# Patient Record
Sex: FEMALE | Race: WHITE | Employment: UNEMPLOYED | URBAN - METROPOLITAN AREA
[De-identification: names, ages, dates, MRNs, and addresses within clinical notes are randomized per-mention and may not be internally consistent; named-entity substitution may affect disease eponyms.]

---

## 2019-08-13 ENCOUNTER — ANESTHESIA (OUTPATIENT)
Dept: SURGERY | Age: 39
DRG: 854 | End: 2019-08-13
Payer: SELF-PAY

## 2019-08-13 ENCOUNTER — ANESTHESIA EVENT (OUTPATIENT)
Dept: SURGERY | Age: 39
DRG: 854 | End: 2019-08-13
Payer: SELF-PAY

## 2019-08-13 ENCOUNTER — APPOINTMENT (OUTPATIENT)
Dept: GENERAL RADIOLOGY | Age: 39
DRG: 854 | End: 2019-08-13
Attending: EMERGENCY MEDICINE
Payer: SELF-PAY

## 2019-08-13 ENCOUNTER — APPOINTMENT (OUTPATIENT)
Dept: CT IMAGING | Age: 39
DRG: 854 | End: 2019-08-13
Attending: EMERGENCY MEDICINE
Payer: SELF-PAY

## 2019-08-13 ENCOUNTER — HOSPITAL ENCOUNTER (INPATIENT)
Age: 39
LOS: 3 days | Discharge: HOME HEALTH | DRG: 854 | End: 2019-08-16
Attending: EMERGENCY MEDICINE | Admitting: INTERNAL MEDICINE
Payer: SELF-PAY

## 2019-08-13 DIAGNOSIS — L03.119 CELLULITIS AND ABSCESS OF HAND: Primary | ICD-10-CM

## 2019-08-13 DIAGNOSIS — L02.519 CELLULITIS AND ABSCESS OF HAND: Primary | ICD-10-CM

## 2019-08-13 PROBLEM — F17.200 TOBACCO USE DISORDER: Status: ACTIVE | Noted: 2019-08-13

## 2019-08-13 PROBLEM — L03.90 CELLULITIS: Status: ACTIVE | Noted: 2019-08-13

## 2019-08-13 PROBLEM — L02.91 ABSCESS: Status: ACTIVE | Noted: 2019-08-13

## 2019-08-13 PROBLEM — D72.829 LEUKOCYTOSIS: Status: ACTIVE | Noted: 2019-08-13

## 2019-08-13 PROBLEM — A41.9 SEPSIS (HCC): Status: ACTIVE | Noted: 2019-08-13

## 2019-08-13 PROBLEM — M32.9 LUPUS (HCC): Status: ACTIVE | Noted: 2019-08-13

## 2019-08-13 LAB
ANION GAP SERPL CALC-SCNC: 13 MMOL/L (ref 5–15)
BASOPHILS # BLD: 0 K/UL (ref 0–0.1)
BASOPHILS NFR BLD: 0 % (ref 0–1)
BUN SERPL-MCNC: 19 MG/DL (ref 6–20)
BUN/CREAT SERPL: 20 (ref 12–20)
CALCIUM SERPL-MCNC: 9.3 MG/DL (ref 8.5–10.1)
CHLORIDE SERPL-SCNC: 104 MMOL/L (ref 97–108)
CO2 SERPL-SCNC: 21 MMOL/L (ref 21–32)
COMMENT, HOLDF: NORMAL
CREAT SERPL-MCNC: 0.96 MG/DL (ref 0.55–1.02)
DIFFERENTIAL METHOD BLD: ABNORMAL
EOSINOPHIL # BLD: 0 K/UL (ref 0–0.4)
EOSINOPHIL NFR BLD: 0 % (ref 0–7)
ERYTHROCYTE [DISTWIDTH] IN BLOOD BY AUTOMATED COUNT: 13.8 % (ref 11.5–14.5)
GLUCOSE SERPL-MCNC: 121 MG/DL (ref 65–100)
HCT VFR BLD AUTO: 43.4 % (ref 35–47)
HGB BLD-MCNC: 15.2 G/DL (ref 11.5–16)
IMM GRANULOCYTES # BLD AUTO: 0 K/UL (ref 0–0.04)
IMM GRANULOCYTES NFR BLD AUTO: 0 % (ref 0–0.5)
LACTATE BLD-SCNC: 1.25 MMOL/L (ref 0.4–2)
LYMPHOCYTES # BLD: 5.9 K/UL (ref 0.8–3.5)
LYMPHOCYTES NFR BLD: 23 % (ref 12–49)
MCH RBC QN AUTO: 31.2 PG (ref 26–34)
MCHC RBC AUTO-ENTMCNC: 35 G/DL (ref 30–36.5)
MCV RBC AUTO: 89.1 FL (ref 80–99)
MONOCYTES # BLD: 1.5 K/UL (ref 0–1)
MONOCYTES NFR BLD: 6 % (ref 5–13)
NEUTS BAND NFR BLD MANUAL: 1 %
NEUTS SEG # BLD: 18.2 K/UL (ref 1.8–8)
NEUTS SEG NFR BLD: 70 % (ref 32–75)
PLATELET # BLD AUTO: 324 K/UL (ref 150–400)
PMV BLD AUTO: 9.4 FL (ref 8.9–12.9)
POTASSIUM SERPL-SCNC: 3.9 MMOL/L (ref 3.5–5.1)
RBC # BLD AUTO: 4.87 M/UL (ref 3.8–5.2)
RBC MORPH BLD: ABNORMAL
SAMPLES BEING HELD,HOLD: NORMAL
SODIUM SERPL-SCNC: 138 MMOL/L (ref 136–145)
WBC # BLD AUTO: 25.6 K/UL (ref 3.6–11)

## 2019-08-13 PROCEDURE — 74011000272 HC RX REV CODE- 272: Performed by: ORTHOPAEDIC SURGERY

## 2019-08-13 PROCEDURE — 87205 SMEAR GRAM STAIN: CPT

## 2019-08-13 PROCEDURE — 74011000258 HC RX REV CODE- 258: Performed by: INTERNAL MEDICINE

## 2019-08-13 PROCEDURE — 76060000032 HC ANESTHESIA 0.5 TO 1 HR: Performed by: ORTHOPAEDIC SURGERY

## 2019-08-13 PROCEDURE — 74011250636 HC RX REV CODE- 250/636: Performed by: NURSE ANESTHETIST, CERTIFIED REGISTERED

## 2019-08-13 PROCEDURE — 73201 CT UPPER EXTREMITY W/DYE: CPT

## 2019-08-13 PROCEDURE — 74011250636 HC RX REV CODE- 250/636: Performed by: EMERGENCY MEDICINE

## 2019-08-13 PROCEDURE — 87185 SC STD ENZYME DETCJ PER NZM: CPT

## 2019-08-13 PROCEDURE — 90715 TDAP VACCINE 7 YRS/> IM: CPT | Performed by: EMERGENCY MEDICINE

## 2019-08-13 PROCEDURE — 83605 ASSAY OF LACTIC ACID: CPT

## 2019-08-13 PROCEDURE — 96375 TX/PRO/DX INJ NEW DRUG ADDON: CPT

## 2019-08-13 PROCEDURE — 74011250636 HC RX REV CODE- 250/636: Performed by: INTERNAL MEDICINE

## 2019-08-13 PROCEDURE — 74011250636 HC RX REV CODE- 250/636

## 2019-08-13 PROCEDURE — 0J9J0ZZ DRAINAGE OF RIGHT HAND SUBCUTANEOUS TISSUE AND FASCIA, OPEN APPROACH: ICD-10-PCS | Performed by: ORTHOPAEDIC SURGERY

## 2019-08-13 PROCEDURE — 90471 IMMUNIZATION ADMIN: CPT

## 2019-08-13 PROCEDURE — 94761 N-INVAS EAR/PLS OXIMETRY MLT: CPT

## 2019-08-13 PROCEDURE — 87040 BLOOD CULTURE FOR BACTERIA: CPT

## 2019-08-13 PROCEDURE — 87077 CULTURE AEROBIC IDENTIFY: CPT

## 2019-08-13 PROCEDURE — 74011250636 HC RX REV CODE- 250/636: Performed by: ANESTHESIOLOGY

## 2019-08-13 PROCEDURE — 36415 COLL VENOUS BLD VENIPUNCTURE: CPT

## 2019-08-13 PROCEDURE — 77030011640 HC PAD GRND REM COVD -A: Performed by: ORTHOPAEDIC SURGERY

## 2019-08-13 PROCEDURE — 85025 COMPLETE CBC W/AUTO DIFF WBC: CPT

## 2019-08-13 PROCEDURE — 74011000250 HC RX REV CODE- 250: Performed by: ORTHOPAEDIC SURGERY

## 2019-08-13 PROCEDURE — 65270000029 HC RM PRIVATE

## 2019-08-13 PROCEDURE — 77030018836 HC SOL IRR NACL ICUM -A: Performed by: ORTHOPAEDIC SURGERY

## 2019-08-13 PROCEDURE — 99284 EMERGENCY DEPT VISIT MOD MDM: CPT

## 2019-08-13 PROCEDURE — 80048 BASIC METABOLIC PNL TOTAL CA: CPT

## 2019-08-13 PROCEDURE — 74011000258 HC RX REV CODE- 258: Performed by: EMERGENCY MEDICINE

## 2019-08-13 PROCEDURE — 87075 CULTR BACTERIA EXCEPT BLOOD: CPT

## 2019-08-13 PROCEDURE — 76010000138 HC OR TIME 0.5 TO 1 HR: Performed by: ORTHOPAEDIC SURGERY

## 2019-08-13 PROCEDURE — 73130 X-RAY EXAM OF HAND: CPT

## 2019-08-13 PROCEDURE — 77030020143 HC AIRWY LARYN INTUB CGAS -A: Performed by: ANESTHESIOLOGY

## 2019-08-13 PROCEDURE — 87186 SC STD MICRODIL/AGAR DIL: CPT

## 2019-08-13 PROCEDURE — 74011636320 HC RX REV CODE- 636/320: Performed by: EMERGENCY MEDICINE

## 2019-08-13 PROCEDURE — 76210000006 HC OR PH I REC 0.5 TO 1 HR: Performed by: ORTHOPAEDIC SURGERY

## 2019-08-13 PROCEDURE — 96365 THER/PROPH/DIAG IV INF INIT: CPT

## 2019-08-13 RX ORDER — MIDAZOLAM HYDROCHLORIDE 1 MG/ML
2 INJECTION, SOLUTION INTRAMUSCULAR; INTRAVENOUS
Status: DISCONTINUED | OUTPATIENT
Start: 2019-08-13 | End: 2019-08-13 | Stop reason: HOSPADM

## 2019-08-13 RX ORDER — ACETAMINOPHEN 325 MG/1
650 TABLET ORAL
Status: DISCONTINUED | OUTPATIENT
Start: 2019-08-13 | End: 2019-08-16 | Stop reason: HOSPADM

## 2019-08-13 RX ORDER — MORPHINE SULFATE 4 MG/ML
4 INJECTION INTRAVENOUS
Status: COMPLETED | OUTPATIENT
Start: 2019-08-13 | End: 2019-08-13

## 2019-08-13 RX ORDER — SODIUM CHLORIDE, SODIUM LACTATE, POTASSIUM CHLORIDE, CALCIUM CHLORIDE 600; 310; 30; 20 MG/100ML; MG/100ML; MG/100ML; MG/100ML
125 INJECTION, SOLUTION INTRAVENOUS CONTINUOUS
Status: DISCONTINUED | OUTPATIENT
Start: 2019-08-13 | End: 2019-08-13 | Stop reason: HOSPADM

## 2019-08-13 RX ORDER — HYDROMORPHONE HYDROCHLORIDE 2 MG/ML
INJECTION, SOLUTION INTRAMUSCULAR; INTRAVENOUS; SUBCUTANEOUS AS NEEDED
Status: DISCONTINUED | OUTPATIENT
Start: 2019-08-13 | End: 2019-08-13 | Stop reason: HOSPADM

## 2019-08-13 RX ORDER — FLUMAZENIL 0.1 MG/ML
0.2 INJECTION INTRAVENOUS
Status: DISCONTINUED | OUTPATIENT
Start: 2019-08-13 | End: 2019-08-13 | Stop reason: HOSPADM

## 2019-08-13 RX ORDER — HYDROMORPHONE HYDROCHLORIDE 1 MG/ML
1 INJECTION, SOLUTION INTRAMUSCULAR; INTRAVENOUS; SUBCUTANEOUS
Status: DISCONTINUED | OUTPATIENT
Start: 2019-08-13 | End: 2019-08-16 | Stop reason: HOSPADM

## 2019-08-13 RX ORDER — ONDANSETRON 2 MG/ML
INJECTION INTRAMUSCULAR; INTRAVENOUS AS NEEDED
Status: DISCONTINUED | OUTPATIENT
Start: 2019-08-13 | End: 2019-08-13 | Stop reason: HOSPADM

## 2019-08-13 RX ORDER — PROPOFOL 10 MG/ML
INJECTION, EMULSION INTRAVENOUS AS NEEDED
Status: DISCONTINUED | OUTPATIENT
Start: 2019-08-13 | End: 2019-08-13 | Stop reason: HOSPADM

## 2019-08-13 RX ORDER — HYDROMORPHONE HYDROCHLORIDE 1 MG/ML
2 INJECTION, SOLUTION INTRAMUSCULAR; INTRAVENOUS; SUBCUTANEOUS
Status: COMPLETED | OUTPATIENT
Start: 2019-08-13 | End: 2019-08-13

## 2019-08-13 RX ORDER — IBUPROFEN 200 MG
1 TABLET ORAL DAILY
Status: DISCONTINUED | OUTPATIENT
Start: 2019-08-14 | End: 2019-08-16 | Stop reason: HOSPADM

## 2019-08-13 RX ORDER — SODIUM CHLORIDE, SODIUM LACTATE, POTASSIUM CHLORIDE, CALCIUM CHLORIDE 600; 310; 30; 20 MG/100ML; MG/100ML; MG/100ML; MG/100ML
50 INJECTION, SOLUTION INTRAVENOUS CONTINUOUS
Status: DISCONTINUED | OUTPATIENT
Start: 2019-08-13 | End: 2019-08-16 | Stop reason: HOSPADM

## 2019-08-13 RX ORDER — FENTANYL CITRATE 50 UG/ML
INJECTION, SOLUTION INTRAMUSCULAR; INTRAVENOUS AS NEEDED
Status: DISCONTINUED | OUTPATIENT
Start: 2019-08-13 | End: 2019-08-13 | Stop reason: HOSPADM

## 2019-08-13 RX ORDER — MIDAZOLAM HYDROCHLORIDE 1 MG/ML
INJECTION, SOLUTION INTRAMUSCULAR; INTRAVENOUS AS NEEDED
Status: DISCONTINUED | OUTPATIENT
Start: 2019-08-13 | End: 2019-08-13 | Stop reason: HOSPADM

## 2019-08-13 RX ORDER — HYDROMORPHONE HYDROCHLORIDE 2 MG/ML
INJECTION, SOLUTION INTRAMUSCULAR; INTRAVENOUS; SUBCUTANEOUS
Status: COMPLETED
Start: 2019-08-13 | End: 2019-08-13

## 2019-08-13 RX ORDER — HYDROMORPHONE HYDROCHLORIDE 1 MG/ML
.25-1 INJECTION, SOLUTION INTRAMUSCULAR; INTRAVENOUS; SUBCUTANEOUS
Status: DISCONTINUED | OUTPATIENT
Start: 2019-08-13 | End: 2019-08-13 | Stop reason: HOSPADM

## 2019-08-13 RX ORDER — LIDOCAINE HYDROCHLORIDE 20 MG/ML
INJECTION, SOLUTION EPIDURAL; INFILTRATION; INTRACAUDAL; PERINEURAL AS NEEDED
Status: DISCONTINUED | OUTPATIENT
Start: 2019-08-13 | End: 2019-08-13 | Stop reason: HOSPADM

## 2019-08-13 RX ORDER — DIPHENHYDRAMINE HYDROCHLORIDE 50 MG/ML
12.5 INJECTION, SOLUTION INTRAMUSCULAR; INTRAVENOUS AS NEEDED
Status: DISCONTINUED | OUTPATIENT
Start: 2019-08-13 | End: 2019-08-13 | Stop reason: HOSPADM

## 2019-08-13 RX ORDER — NALOXONE HYDROCHLORIDE 0.4 MG/ML
0.2 INJECTION, SOLUTION INTRAMUSCULAR; INTRAVENOUS; SUBCUTANEOUS
Status: DISCONTINUED | OUTPATIENT
Start: 2019-08-13 | End: 2019-08-13 | Stop reason: HOSPADM

## 2019-08-13 RX ORDER — METRONIDAZOLE 500 MG/100ML
500 INJECTION, SOLUTION INTRAVENOUS EVERY 12 HOURS
Status: DISCONTINUED | OUTPATIENT
Start: 2019-08-14 | End: 2019-08-15

## 2019-08-13 RX ORDER — METRONIDAZOLE 500 MG/100ML
500 INJECTION, SOLUTION INTRAVENOUS
Status: COMPLETED | OUTPATIENT
Start: 2019-08-13 | End: 2019-08-13

## 2019-08-13 RX ORDER — SODIUM CHLORIDE 0.9 % (FLUSH) 0.9 %
5-40 SYRINGE (ML) INJECTION AS NEEDED
Status: DISCONTINUED | OUTPATIENT
Start: 2019-08-13 | End: 2019-08-16 | Stop reason: HOSPADM

## 2019-08-13 RX ORDER — NALOXONE HYDROCHLORIDE 0.4 MG/ML
0.4 INJECTION, SOLUTION INTRAMUSCULAR; INTRAVENOUS; SUBCUTANEOUS AS NEEDED
Status: DISCONTINUED | OUTPATIENT
Start: 2019-08-13 | End: 2019-08-16 | Stop reason: HOSPADM

## 2019-08-13 RX ORDER — SODIUM CHLORIDE 0.9 % (FLUSH) 0.9 %
5-40 SYRINGE (ML) INJECTION EVERY 8 HOURS
Status: DISCONTINUED | OUTPATIENT
Start: 2019-08-13 | End: 2019-08-16 | Stop reason: HOSPADM

## 2019-08-13 RX ORDER — LIDOCAINE HYDROCHLORIDE 10 MG/ML
0.1 INJECTION, SOLUTION EPIDURAL; INFILTRATION; INTRACAUDAL; PERINEURAL AS NEEDED
Status: DISCONTINUED | OUTPATIENT
Start: 2019-08-13 | End: 2019-08-13 | Stop reason: HOSPADM

## 2019-08-13 RX ADMIN — TETANUS TOXOID, REDUCED DIPHTHERIA TOXOID AND ACELLULAR PERTUSSIS VACCINE, ADSORBED 0.5 ML: 5; 2.5; 8; 8; 2.5 SUSPENSION INTRAMUSCULAR at 16:54

## 2019-08-13 RX ADMIN — FENTANYL CITRATE 75 MCG: 50 INJECTION, SOLUTION INTRAMUSCULAR; INTRAVENOUS at 22:17

## 2019-08-13 RX ADMIN — FENTANYL CITRATE 100 MCG: 50 INJECTION, SOLUTION INTRAMUSCULAR; INTRAVENOUS at 22:11

## 2019-08-13 RX ADMIN — CEFEPIME 2 G: 2 INJECTION, POWDER, FOR SOLUTION INTRAVENOUS at 22:23

## 2019-08-13 RX ADMIN — HYDROMORPHONE HYDROCHLORIDE 1 MG: 2 INJECTION INTRAMUSCULAR; INTRAVENOUS; SUBCUTANEOUS at 22:53

## 2019-08-13 RX ADMIN — FENTANYL CITRATE 50 MCG: 50 INJECTION, SOLUTION INTRAMUSCULAR; INTRAVENOUS at 22:32

## 2019-08-13 RX ADMIN — SODIUM CHLORIDE, SODIUM LACTATE, POTASSIUM CHLORIDE, AND CALCIUM CHLORIDE 150 ML/HR: 600; 310; 30; 20 INJECTION, SOLUTION INTRAVENOUS at 20:20

## 2019-08-13 RX ADMIN — IOPAMIDOL 100 ML: 612 INJECTION, SOLUTION INTRAVENOUS at 17:53

## 2019-08-13 RX ADMIN — SODIUM CHLORIDE 1000 ML: 900 INJECTION, SOLUTION INTRAVENOUS at 17:07

## 2019-08-13 RX ADMIN — ONDANSETRON 4 MG: 2 INJECTION INTRAMUSCULAR; INTRAVENOUS at 22:30

## 2019-08-13 RX ADMIN — METRONIDAZOLE 500 MG: 500 INJECTION, SOLUTION INTRAVENOUS at 20:20

## 2019-08-13 RX ADMIN — HYDROMORPHONE HYDROCHLORIDE 2 MG: 1 INJECTION, SOLUTION INTRAMUSCULAR; INTRAVENOUS; SUBCUTANEOUS at 18:33

## 2019-08-13 RX ADMIN — LIDOCAINE HYDROCHLORIDE 50 MG: 20 INJECTION, SOLUTION INTRAVENOUS at 22:17

## 2019-08-13 RX ADMIN — PROPOFOL 160 MG: 10 INJECTION, EMULSION INTRAVENOUS at 22:17

## 2019-08-13 RX ADMIN — CEFTRIAXONE SODIUM 1 G: 1 INJECTION, POWDER, FOR SOLUTION INTRAMUSCULAR; INTRAVENOUS at 17:08

## 2019-08-13 RX ADMIN — SODIUM CHLORIDE, SODIUM LACTATE, POTASSIUM CHLORIDE, AND CALCIUM CHLORIDE 125 ML/HR: 600; 310; 30; 20 INJECTION, SOLUTION INTRAVENOUS at 22:07

## 2019-08-13 RX ADMIN — MORPHINE SULFATE 4 MG: 4 INJECTION, SOLUTION INTRAMUSCULAR; INTRAVENOUS at 16:57

## 2019-08-13 RX ADMIN — MIDAZOLAM HYDROCHLORIDE 2 MG: 1 INJECTION, SOLUTION INTRAMUSCULAR; INTRAVENOUS at 22:11

## 2019-08-13 RX ADMIN — VANCOMYCIN HYDROCHLORIDE 1000 MG: 1 INJECTION, POWDER, LYOPHILIZED, FOR SOLUTION INTRAVENOUS at 18:39

## 2019-08-13 RX ADMIN — CEFEPIME HYDROCHLORIDE 2 G: 2 INJECTION, POWDER, FOR SOLUTION INTRAVENOUS at 22:40

## 2019-08-13 RX ADMIN — FENTANYL CITRATE 25 MCG: 50 INJECTION, SOLUTION INTRAMUSCULAR; INTRAVENOUS at 22:40

## 2019-08-13 NOTE — ED NOTES
TRANSFER - OUT REPORT:    Verbal report given to Ronen Moore RN (name) on Sydni Martinez  being transferred to [facility] OR/ PACU (unit) for routine progression of care       Report consisted of patients Situation, Background, Assessment and   Recommendations(SBAR). Information from the following report(s) SBAR was reviewed with the receiving nurse. Lines:   Peripheral IV 08/13/19 Left Arm (Active)   Site Assessment Clean, dry, & intact 8/13/2019  4:45 PM   Phlebitis Assessment 0 8/13/2019  4:45 PM   Infiltration Assessment 0 8/13/2019  4:45 PM   Dressing Status Clean, dry, & intact 8/13/2019  4:45 PM   Dressing Type Tape;Transparent 8/13/2019  4:45 PM   Hub Color/Line Status Pink;Patent; Flushed 8/13/2019  4:45 PM   Action Taken Blood drawn 8/13/2019  4:45 PM   Alcohol Cap Used Yes 8/13/2019  4:45 PM        Opportunity for questions and clarification was provided.       Patient transported with:   Monitor

## 2019-08-13 NOTE — ED NOTES
Pre op checklist initiated and CHG wipes being completed in room now.  Hospitalist at bedside and writing H and P

## 2019-08-13 NOTE — H&P
[facility]  [address] [facility], [facility] [address]  [phone]    Hospitalist Admission Note      NAME:  Teresa El   :   1980   MRN:  [id number]     PCP:  None     Date/Time:  2019 7:34 PM         Assessment / Plan:        Abscess / Cellulitis: Diffuse soft tissue swelling of the hand with a rim-enhancing collection over the dorsum of the third and fourth metacarpals most consistent with abscess. Reports possible wasp sting. Endorses prior cocaine and marijuana use but states no use in years. Has a bruise at [facility] fossa, no attempt for IV placement there was done by nursing staff; denies IVDA. Check UDS. Discussed with ortho, who plans to take pt to OR tonight for I&D. Blood cultures sent. Check intraoperative cultures. Empiric IV vanc, cefepime, and Flagyl. Leukocytosis / Sepsis: due to the above. IV abx, IVF, I&D tonight. Lupus ([address]): reports hx of this, but not followed by rheumatology      Tobacco use disorder: smokes ~10 cigs per day. recommended smoking cessation. Discussed possible pharmacotherapy including nicotine replacement therapy, Chantix/Wellbutrin. Provided number for 1-800-QUIT-NOW. I spent ~4 minutes (outside of the time documented for this note) exclusively focused on tobacco cessation counseling      Code Status: FULL     ED notes and lab results reviewed.        Total time spent with patient: 50 Minutes   Time spent in the care of this patient included reviewing records, discussing with nursing, obtaining history and examining the patient, and discussing treatment plans, with >50% time spent counseling/coordinating care    Risk of deterioration: High                 Care Plan discussed with: ED provider, Patient, Nursing Staff, Consultant/Specialist and >50% of time spent in counseling and coordination of care    Discussed:  Care Plan    Prophylaxis:  SCD's    Disposition:  Home w/Family                 Subjective:     CHIEF COMPLAINT: R hand swelling/redness     HISTORY OF PRESENT ILLNESS:     Ms. Melinda Persaud is a 34 y.o. female w/ hx of Lupus who presents with R hand swelling. Started ~2 days ago, thought possibly wasp sting however she is not sure, associated with redness, pain, and fevers and chills. Symptoms are severe and worsening prompting ER visit tonight. ED workup showed SIRS; CT RUE showed diffuse soft tissue swelling of the hand with a rim-enhancing collection over  the dorsum of the third and fourth metacarpals most consistent with abscess. Lactate WNR. Ms. Melinda Persaud is admitted for further evaluation and management.       Past Medical History:   Diagnosis Date    Asthma     Bronchitis     Lupus ([address]     Migraine     Pneumonia         Past Surgical History:   Procedure Laterality Date    HX APPENDECTOMY      HX HYSTERECTOMY      HX KNEE ARTHROSCOPY         Social History     Tobacco Use    Smoking status: Current Every Day Smoker     Packs/day: 0.50    Smokeless tobacco: Never Used   Substance Use Topics    Alcohol use: Yes     Frequency: Never     Comment: rarely once every 6 months        Family History: family history of oral CA, CV disease    Allergies   Allergen Reactions    Tetracycline Unknown (comments)     Can't remember        Prior to Admission medications    Not on File       Review of Systems:  (bold if positive, if negative)    Gen:   fever, chills, fatigueEyes:  ENT:  CVS:  Pulm:  GI:    :    MS:  PainswellingSkin:  Rash, erythema, abscess, Psych:  Endo:    Hem:  Renal:    Neuro:            Objective:      VITALS:    Vital signs reviewed; most recent are:    Visit Vitals  /80 (BP 1 Location: Left arm, BP Patient Position: Sitting)   Pulse 99   Temp 99.3 °F (37.4 °C)   Resp 17   Ht 5' 5\" (1.651 m)   Wt 68.4 kg (150 lb 12.7 oz)   SpO2 93%   BMI 25.09 kg/m²     SpO2 Readings from Last 6 Encounters:   08/13/19 93%        No intake or output data in the 24 hours ending 08/13/19 1934 Exam:     Physical Exam:    Gen:  Disheveled. Well-developed, well-nourished, in no acute distress  HEENT:  No scleral icterus, PERRL, hearing intact to voice, dry mucous membranes  Neck:  Supple, without masses. Resp:  No accessory muscle use. CTAB without wheezing, rales, rhonchi  Card: tachycardic, reg rhythm. Normal S1 and S2 without murmurs, rubs, or gallops. No LE edema. No JVD. Peripheral pulses in tact including R radial pulse  Abd:  Normoactive bowel sounds. Soft, non-tender, non-distended. No rebound, no guarding. No appreciable hepatosplenomegaly   Musc:  No cyanosis or clubbing  Skin:  R hand erythema. R AC fossa bruise. Turgor intact. Cap refill ~2 secs  Neuro:  Cranial nerves are grossly intact, no focal motor weakness, follows commands appropriately  Psych:  Good insight, normal affect. Alert, oriented x 3. Answers questions appropriately       Labs:    Recent Labs     08/13/19  1652   WBC 25.6*   HGB 15.2   HCT 43.4        Recent Labs     08/13/19  1652      K 3.9      CO2 21   *   BUN 19   CREA 0.96   CA 9.3     No components found for: GLPOC  No results for input(s): PH, PCO2, PO2, HCO3, FIO2 in the last 72 hours. No results for input(s): INR in the last 72 hours. No lab exists for component: INREXT  No results found for: SDES  No results found for: CULT  All other current labs reviewed in the computer. Imaging/Studies:    CT RUE  Diffuse soft tissue swelling of the hand with a rim-enhancing collection over the dorsum of the third and fourth metacarpals most consistent with abscess.  No acute fracture    ___________________________________________________    Attending Physician: Wen Kendall MD

## 2019-08-13 NOTE — ED TRIAGE NOTES
Pt ambulates to treatment area she states that on Sunday she was swinging at a wasp to kill it and hit her had on the door frame. Today it is swollen and red, denies any fevers with the hand.   Dr Donato Wolfe at the bedside

## 2019-08-13 NOTE — ED PROVIDER NOTES
HPI     Pt is a 34 y.o. F with PMH of lupus presenting to ED with c/o right hand swelling, pain, and redness x 3 days. She is left hand dominant. She initially noticed symptoms Sunday (2 days ago) after trying to kill as wasp. She says she is unsure if she was stung by a wasp. However, she does remember she hit her hand on the door frame. She noticed pain at that time but swelling, redness and pain have increased. She took an unprescribed percocet and gabapentin last night for pain and has iced the area. She denies IV drug use. No fever or chills. No h/o recurrent abscess or skin infections. She denies other complaints at this time. Past Medical History:   Diagnosis Date    Asthma     Bronchitis     Lup[address] ([facility] [address]     Migraine     Pneumonia        Past Surgical History:   Procedure Laterality Date    HX APPENDECTOMY      HX HYSTERECTOMY      HX KNEE ARTHROSCOPY           History reviewed. No pertinent family history.     Social History     Socioeconomic History    Marital status: Not on file     Spouse name: Not on file    Number of children: Not on file    Years of education: Not on file    Highest education level: Not on file   Occupational History    Not on file   Social Needs    Financial resource strain: Not on file    Food insecurity:     Worry: Not on file     Inability: Not on file    Transportation needs:     Medical: Not on file     Non-medical: Not on file   Tobacco Use    Smoking status: Current Every Day Smoker     Packs/day: 0.50    Smokeless tobacco: Never Used   Substance and Sexual Activity    Alcohol use: Yes     Frequency: Never     Comment: rarely once every 6 months    Drug use: Never    Sexual activity: Not on file   Lifestyle    Physical activity:     Days per week: Not on file     Minutes per session: Not on file    Stress: Not on file   Relationships    Social connections:     Talks on phone: Not on file     Gets together: Not on file     Attends [employer] service: Not on file     Active member of club or organization: Not on file     Attends meetings of clubs or organizations: Not on file     Relationship status: Not on file    Intimate partner violence:     Fear of current or ex partner: Not on file     Emotionally abused: Not on file     Physically abused: Not on file     Forced sexual activity: Not on file   Other Topics Concern    Not on file   Social History Narrative    Not on file         ALLERGIES: Tetracycline    Review of Systems   Constitutional: Negative for fever. Respiratory: Negative for chest tightness and shortness of breath. Cardiovascular: Negative for chest pain and leg swelling. Gastrointestinal: Negative for nausea and vomiting. Musculoskeletal: Positive for arthralgias. Skin: Positive for color change. Negative for wound. Allergic/Immunologic: Negative for immunocompromised state. Neurological: Negative for numbness. Hematological: Negative for adenopathy. Does not bruise/bleed easily. Psychiatric/Behavioral: Negative for self-injury. The patient is nervous/anxious. Visit Vitals  /85 (BP 1 Location: Left arm, BP Patient Position: At rest)   Pulse 70   Temp 97.9 °F (36.6 °C)   Resp 18   Ht 5' 5\" (1.651 m)   Wt 68.4 kg (150 lb 12.7 oz)   SpO2 99%   BMI 25.09 kg/m²         Physical Exam   Constitutional: She is oriented to person, place, and time. She appears well-developed and well-nourished. No distress. HENT:   Head: Normocephalic and atraumatic. Eyes: Pupils are equal, round, and reactive to light. Conjunctivae and EOM are normal.   Neck: Normal range of motion. Cardiovascular: Regular rhythm, normal heart sounds, intact distal pulses and normal pulses. Tachycardia present. Pulmonary/Chest: Effort normal and breath sounds normal.   Abdominal: Soft. Bowel sounds are normal. There is no tenderness. Musculoskeletal: She exhibits edema and tenderness.         Right hand: She exhibits decreased range of motion, tenderness and swelling. Normal sensation noted. Neurological: She is alert and oriented to person, place, and time. No sensory deficit. Skin: Skin is warm and dry. She is not diaphoretic. Psychiatric: Her mood appears anxious. MDM       Procedures    Hospitalist TigerText for Admission (Dr. Stephenie Crawford)  6:24 PM    ED Room Number: WER07/07  Patient Name and age:  Ana Dean 34 y.o.  female  Working Diagnosis:   1. Cellulitis and abscess of hand      Readmission: no  Isolation Requirements:  no  Recommended Level of Care:  med/surg  Code Status:  Full Code  Department:[facility] - [phone]  Other:  Ortho consulted. (Marek Mora and Dr. Abbe Ceja). Would like pt NPO and keep hand elevated. Accepted by Dr. Stephenie Crawford 6:34 PM.  He would like to add flagyl and have vanc, cefipime, and flagyl. Will adjust antibiotics at this time. Ortho has come to see pt in ED and will plan to take her to [address] [facility].      Gary Farmer MD

## 2019-08-14 LAB
ALBUMIN SERPL-MCNC: 3.2 G/DL (ref 3.5–5)
ALBUMIN/GLOB SERPL: 0.9 {RATIO} (ref 1.1–2.2)
ALP SERPL-CCNC: 78 U/L (ref 45–117)
ALT SERPL-CCNC: 55 U/L (ref 12–78)
AMPHET UR QL SCN: NEGATIVE
ANION GAP SERPL CALC-SCNC: 4 MMOL/L (ref 5–15)
AST SERPL-CCNC: 20 U/L (ref 15–37)
BARBITURATES UR QL SCN: NEGATIVE
BASOPHILS # BLD: 0.1 K/UL (ref 0–0.1)
BASOPHILS NFR BLD: 0 % (ref 0–1)
BENZODIAZ UR QL: POSITIVE
BILIRUB SERPL-MCNC: 0.5 MG/DL (ref 0.2–1)
BUN SERPL-MCNC: 11 MG/DL (ref 6–20)
BUN/CREAT SERPL: 13 (ref 12–20)
CALCIUM SERPL-MCNC: 8.3 MG/DL (ref 8.5–10.1)
CANNABINOIDS UR QL SCN: NEGATIVE
CHLORIDE SERPL-SCNC: 112 MMOL/L (ref 97–108)
CO2 SERPL-SCNC: 23 MMOL/L (ref 21–32)
COCAINE UR QL SCN: NEGATIVE
CREAT SERPL-MCNC: 0.83 MG/DL (ref 0.55–1.02)
DIFFERENTIAL METHOD BLD: ABNORMAL
DRUG SCRN COMMENT,DRGCM: ABNORMAL
EOSINOPHIL # BLD: 0.1 K/UL (ref 0–0.4)
EOSINOPHIL NFR BLD: 0 % (ref 0–7)
ERYTHROCYTE [DISTWIDTH] IN BLOOD BY AUTOMATED COUNT: 13.4 % (ref 11.5–14.5)
GLOBULIN SER CALC-MCNC: 3.7 G/DL (ref 2–4)
GLUCOSE SERPL-MCNC: 163 MG/DL (ref 65–100)
HCT VFR BLD AUTO: 40.2 % (ref 35–47)
HGB BLD-MCNC: 13.3 G/DL (ref 11.5–16)
IMM GRANULOCYTES # BLD AUTO: 0.2 K/UL (ref 0–0.04)
IMM GRANULOCYTES NFR BLD AUTO: 1 % (ref 0–0.5)
LYMPHOCYTES # BLD: 2.3 K/UL (ref 0.8–3.5)
LYMPHOCYTES NFR BLD: 10 % (ref 12–49)
MAGNESIUM SERPL-MCNC: 2.1 MG/DL (ref 1.6–2.4)
MCH RBC QN AUTO: 30.7 PG (ref 26–34)
MCHC RBC AUTO-ENTMCNC: 33.1 G/DL (ref 30–36.5)
MCV RBC AUTO: 92.8 FL (ref 80–99)
METHADONE UR QL: NEGATIVE
MONOCYTES # BLD: 1.1 K/UL (ref 0–1)
MONOCYTES NFR BLD: 5 % (ref 5–13)
NEUTS SEG # BLD: 18.7 K/UL (ref 1.8–8)
NEUTS SEG NFR BLD: 84 % (ref 32–75)
NRBC # BLD: 0 K/UL (ref 0–0.01)
NRBC BLD-RTO: 0 PER 100 WBC
OPIATES UR QL: POSITIVE
PCP UR QL: NEGATIVE
PHOSPHATE SERPL-MCNC: 3 MG/DL (ref 2.6–4.7)
PLATELET # BLD AUTO: 266 K/UL (ref 150–400)
PMV BLD AUTO: 9.3 FL (ref 8.9–12.9)
POTASSIUM SERPL-SCNC: 4.4 MMOL/L (ref 3.5–5.1)
PROT SERPL-MCNC: 6.9 G/DL (ref 6.4–8.2)
RBC # BLD AUTO: 4.33 M/UL (ref 3.8–5.2)
SODIUM SERPL-SCNC: 139 MMOL/L (ref 136–145)
WBC # BLD AUTO: 22.4 K/UL (ref 3.6–11)

## 2019-08-14 PROCEDURE — 80053 COMPREHEN METABOLIC PANEL: CPT

## 2019-08-14 PROCEDURE — 80307 DRUG TEST PRSMV CHEM ANLYZR: CPT

## 2019-08-14 PROCEDURE — 84100 ASSAY OF PHOSPHORUS: CPT

## 2019-08-14 PROCEDURE — 74011250636 HC RX REV CODE- 250/636: Performed by: INTERNAL MEDICINE

## 2019-08-14 PROCEDURE — 83735 ASSAY OF MAGNESIUM: CPT

## 2019-08-14 PROCEDURE — 36415 COLL VENOUS BLD VENIPUNCTURE: CPT

## 2019-08-14 PROCEDURE — 65270000029 HC RM PRIVATE

## 2019-08-14 PROCEDURE — 77010033678 HC OXYGEN DAILY

## 2019-08-14 PROCEDURE — 74011250637 HC RX REV CODE- 250/637: Performed by: INTERNAL MEDICINE

## 2019-08-14 PROCEDURE — 94760 N-INVAS EAR/PLS OXIMETRY 1: CPT

## 2019-08-14 PROCEDURE — 74011000258 HC RX REV CODE- 258: Performed by: INTERNAL MEDICINE

## 2019-08-14 PROCEDURE — 85025 COMPLETE CBC W/AUTO DIFF WBC: CPT

## 2019-08-14 RX ORDER — ONDANSETRON 2 MG/ML
4 INJECTION INTRAMUSCULAR; INTRAVENOUS
Status: DISCONTINUED | OUTPATIENT
Start: 2019-08-14 | End: 2019-08-16 | Stop reason: HOSPADM

## 2019-08-14 RX ADMIN — HYDROMORPHONE HYDROCHLORIDE 1 MG: 1 INJECTION, SOLUTION INTRAMUSCULAR; INTRAVENOUS; SUBCUTANEOUS at 12:02

## 2019-08-14 RX ADMIN — ONDANSETRON 4 MG: 2 INJECTION INTRAMUSCULAR; INTRAVENOUS at 22:39

## 2019-08-14 RX ADMIN — ACETAMINOPHEN 650 MG: 325 TABLET ORAL at 06:32

## 2019-08-14 RX ADMIN — HYDROMORPHONE HYDROCHLORIDE 1 MG: 1 INJECTION, SOLUTION INTRAMUSCULAR; INTRAVENOUS; SUBCUTANEOUS at 16:01

## 2019-08-14 RX ADMIN — SODIUM CHLORIDE, SODIUM LACTATE, POTASSIUM CHLORIDE, AND CALCIUM CHLORIDE 150 ML/HR: 600; 310; 30; 20 INJECTION, SOLUTION INTRAVENOUS at 00:05

## 2019-08-14 RX ADMIN — SODIUM CHLORIDE, SODIUM LACTATE, POTASSIUM CHLORIDE, AND CALCIUM CHLORIDE 150 ML/HR: 600; 310; 30; 20 INJECTION, SOLUTION INTRAVENOUS at 19:19

## 2019-08-14 RX ADMIN — Medication 10 ML: at 00:05

## 2019-08-14 RX ADMIN — ACETAMINOPHEN 650 MG: 325 TABLET ORAL at 18:01

## 2019-08-14 RX ADMIN — METRONIDAZOLE 500 MG: 500 INJECTION, SOLUTION INTRAVENOUS at 20:05

## 2019-08-14 RX ADMIN — CEFEPIME 2 G: 2 INJECTION, POWDER, FOR SOLUTION INTRAVENOUS at 05:28

## 2019-08-14 RX ADMIN — VANCOMYCIN HYDROCHLORIDE 1000 MG: 1 INJECTION, POWDER, LYOPHILIZED, FOR SOLUTION INTRAVENOUS at 17:40

## 2019-08-14 RX ADMIN — HYDROMORPHONE HYDROCHLORIDE 1 MG: 1 INJECTION, SOLUTION INTRAMUSCULAR; INTRAVENOUS; SUBCUTANEOUS at 04:02

## 2019-08-14 RX ADMIN — ACETAMINOPHEN 650 MG: 325 TABLET ORAL at 00:04

## 2019-08-14 RX ADMIN — CEFEPIME 2 G: 2 INJECTION, POWDER, FOR SOLUTION INTRAVENOUS at 21:34

## 2019-08-14 RX ADMIN — ACETAMINOPHEN 650 MG: 325 TABLET ORAL at 12:09

## 2019-08-14 RX ADMIN — HYDROMORPHONE HYDROCHLORIDE 1 MG: 1 INJECTION, SOLUTION INTRAMUSCULAR; INTRAVENOUS; SUBCUTANEOUS at 00:04

## 2019-08-14 RX ADMIN — METRONIDAZOLE 500 MG: 500 INJECTION, SOLUTION INTRAVENOUS at 08:12

## 2019-08-14 RX ADMIN — HYDROMORPHONE HYDROCHLORIDE 1 MG: 1 INJECTION, SOLUTION INTRAMUSCULAR; INTRAVENOUS; SUBCUTANEOUS at 08:12

## 2019-08-14 RX ADMIN — CEFEPIME 2 G: 2 INJECTION, POWDER, FOR SOLUTION INTRAVENOUS at 12:02

## 2019-08-14 RX ADMIN — Medication 10 ML: at 05:28

## 2019-08-14 RX ADMIN — HYDROMORPHONE HYDROCHLORIDE 1 MG: 1 INJECTION, SOLUTION INTRAMUSCULAR; INTRAVENOUS; SUBCUTANEOUS at 20:04

## 2019-08-14 RX ADMIN — VANCOMYCIN HYDROCHLORIDE 1000 MG: 1 INJECTION, POWDER, LYOPHILIZED, FOR SOLUTION INTRAVENOUS at 06:32

## 2019-08-14 RX ADMIN — Medication 10 ML: at 21:34

## 2019-08-14 NOTE — ED NOTES
Patient has a small plastic bag with her metal left nare ring in it and a multicolored bracelet and a metal necklace. She placed them in her purse.  All other belongings are in a patient belongings bag and she is not sure if family will arrive before the transfer

## 2019-08-14 NOTE — PROGRESS NOTES
Bedside shift change report given to Jake Hobbs RN (oncoming nurse) by Rosealee Spurling, RN (offgoing nurse). Report included the following information SBAR, Kardex, Intake/Output, MAR and Recent Results.

## 2019-08-14 NOTE — PROGRESS NOTES
[facility] Dosing Services: Antimicrobial Stewardship Progress Note    Consult for antibiotic dosing of vancomycin by Dr. Stephenie Crawford  Pharmacist reviewed antibiotic appropriateness for 34 year old , female  for indication of R hand abscess  Day of Therapy 1    Plan:  Vancomycin therapy:  Start Vancomycin therapy, Vancomycin 1gm ivpb q12h. Dose calculated to approximate a therapeutic trough of 10-15mcg/mL. Pharmacy to follow daily and will make changes to dose and/or frequency based on clinical status. Date Dose & Interval Measured (mcg/mL) Extrapolated (mcg/mL)   ? 8/13/19 ? 1gm q12h ? ?   ? ? ? ?   ? ? ? ?     *  Other Antimicrobial  (not dosed by pharmacist)   Cefepime 2gm ivpb q8h, metronidazole 500mg ivpb q12h, Rocephin 1gm x1 given at [facility] ER   Cultures     Paired blood cx's pending   Serum Creatinine     Lab Results   Component Value Date/Time    Creatinine 0.96 08/13/2019 04:52 PM         Creatinine Clearance Estimated Creatinine Clearance: 75.8 mL/min (based on SCr of 0.96 mg/dL).      Temp   98.9 °F (37.2 °C)    WBC   Lab Results   Component Value Date/Time    WBC 25.6 (H) 08/13/2019 04:52 PM         H/H   Lab Results   Component Value Date/Time    HGB 15.2 08/13/2019 04:52 PM          Platelets   Lab Results   Component Value Date/Time    PLATELET 597 40/38/9057 04:52 PM            Pharmacist: Angelica Degroot PHARMD Contact information:

## 2019-08-14 NOTE — ED NOTES
Transfer Assessment: Patient A&O x4 and in no distress. Physical re-examination reveals some improvement in pts condition with reassessment of vital signs completed at the time of admission transfer. Patient is afebrile and has decreased right hand pain at this time.

## 2019-08-14 NOTE — BRIEF OP NOTE
BRIEF OPERATIVE NOTE    Date of Procedure: 8/13/2019   Preoperative Diagnosis: Right Hand Abscess  Postoperative Diagnosis: Right Hand Abscess    Procedure(s):  INCISION AND DRAINAGE, RIGHT HAND  Surgeon(s) and Role:     Maria Isabel Beasley MD - Primary         Surgical Assistant: Pablo De Guzman    Surgical Staff:  Circ-1: Monica Greco RN; Tammy Suarez  Circ-2: Joseph Renae RN  Scrub Tech-1: Slade Greenfield; Chino Manuel  Surg Asst-1: Sona VALE  Event Time In Time Out   Incision Start 2229    Incision Close 2241      Anesthesia: General   Estimated Blood Loss: min  Specimens:   ID Type Source Tests Collected by Time Destination   1 : Dorsal hand abscess Wound Hand, Right AEROBIC/ANAEROBIC CULTURE, Colletta Hoots, MD 8/13/2019 2231 Microbiology      Findings: large dorsal subaponeurotic abscess   Complications: none  Implants: * No implants in log *

## 2019-08-14 NOTE — ANESTHESIA POSTPROCEDURE EVALUATION
Procedure(s):  INCISION AND DRAINAGE, RIGHT HAND. general    Anesthesia Post Evaluation      Multimodal analgesia: multimodal analgesia not used between 6 hours prior to anesthesia start to PACU discharge  Patient location during evaluation: bedside  Patient participation: complete - patient participated  Level of consciousness: responsive to light touch  Pain management: adequate  Airway patency: patent  Anesthetic complications: no  Cardiovascular status: acceptable  Respiratory status: acceptable  Hydration status: acceptable  Post anesthesia nausea and vomiting:  controlled      Vitals Value Taken Time   /57 8/13/2019 11:05 PM   Temp 37 °C (98.6 °F) 8/13/2019 10:50 PM   Pulse 100 8/13/2019 11:06 PM   Resp 10 8/13/2019 11:06 PM   SpO2 89 % 8/13/2019 11:06 PM   Vitals shown include unvalidated device data.

## 2019-08-14 NOTE — ANESTHESIA PREPROCEDURE EVALUATION
Relevant Problems   No relevant active problems       Anesthetic History   No history of anesthetic complications            Review of Systems / Medical History  Patient summary reviewed, nursing notes reviewed and pertinent labs reviewed    Pulmonary          Smoker (0.5 PPD X 20 YEARS)         Neuro/Psych   Within defined limits           Cardiovascular                  Exercise tolerance: >4 METS     GI/Hepatic/Renal  Within defined limits              Endo/Other  Within defined limits           Other Findings              Physical Exam    Airway  Mallampati: II  TM Distance: 4 - 6 cm  Neck ROM: normal range of motion   Mouth opening: Normal     Cardiovascular    Rhythm: regular  Rate: normal         Dental  No notable dental hx       Pulmonary  Breath sounds clear to auscultation               Abdominal  GI exam deferred       Other Findings            Anesthetic Plan    ASA: 2, emergent  Anesthesia type: general          Induction: Intravenous  Anesthetic plan and risks discussed with: Patient      DISCUSSED OPTION FOR REGIONAL ANESTHESIA FOR POST OP PAIN, PATIENT AGREES, WILL DISCUSS WITH DR. Drew Dale

## 2019-08-14 NOTE — PROGRESS NOTES
CM Note:  Met with pt for d/c planning. Pt stated PTA she was independent with ADL's, was driving and walked unaided. No Rx coverage; she gets her medications from [facility] in [facility]. She is applying for the Care Card. Reason for Admission:   Abscess                   RRAT Score:     9                Plan for utilizing home health:    None at present                      Current Advanced Directive/Advance Care Plan:  Not on file;  is next of kin                         Transition of Care Plan:                    1. On IV abx  2. Cultures pending  3.  to transport at d/c  L. Vaishali Cole RN    Care Management Interventions  PCP Verified by CM: No(Pt was given a list of [employer] providers.)  Palliative Care Criteria Met (RRAT>21 & CHF Dx)?: No  Mode of Transport at Discharge: Other (see comment)()  [place] Signup: No  Discharge Durable Medical Equipment: No  Physical Therapy Consult: No  Occupational Therapy Consult: No  Speech Therapy Consult: No  Current Support Network: Lives with Spouse, Own Home, Other(2 story house with  and in-laws.   MBR on ground floor; no entry steps.)  Plan discussed with Pt/Family/Caregiver: Yes  Discharge Location  Discharge Placement: Home with two level

## 2019-08-14 NOTE — ROUTINE PROCESS
Interdisciplinary team rounds were held 8/14/2019 with the following team members:Care Management, Nursing, Nutrition, Pharmacy, Physical Therapy and Physician. Plan of care discussed. See clinical pathway and/or care plan for interventions and desired outcomes. Plan: UDS ordered and possibly home in a couple of days.

## 2019-08-14 NOTE — ED NOTES
TRANSFER - OUT REPORT:    Verbal report given to Dudley Paramedic (name) on Charbel Gee  being transferred to [facility] PACU/OR (unit) for routine progression of care       Report consisted of patients Situation, Background, Assessment and   Recommendations(SBAR). Information from the following report(s) SBAR was reviewed with the receiving nurse. Lines:   Peripheral IV 08/13/19 Left Arm (Active)   Site Assessment Clean, dry, & intact 8/13/2019  4:45 PM   Phlebitis Assessment 0 8/13/2019  4:45 PM   Infiltration Assessment 0 8/13/2019  4:45 PM   Dressing Status Clean, dry, & intact 8/13/2019  4:45 PM   Dressing Type Tape;Transparent 8/13/2019  4:45 PM   Hub Color/Line Status Pink;Patent; Flushed 8/13/2019  4:45 PM   Action Taken Blood drawn 8/13/2019  4:45 PM   Alcohol Cap Used Yes 8/13/2019  4:45 PM        Opportunity for questions and clarification was provided.       Patient transported with:   Monitor

## 2019-08-14 NOTE — PROGRESS NOTES
Orthopaedic Progress Note  Post Op day: 1 Day Post-Op    2019 3:27 PM     Patient: Linn Morrison MRN: [id number]  SSN: [id number]    YOB: 1980  Age: 34 y.o. Sex: female      Admit date:  2019  Date of Surgery:  2019   Procedures:  Procedure(s):  INCISION AND DRAINAGE, RIGHT HAND  Admitting Physician:  Jarrett Oneal MD   Surgeon:  Michelet Castillo) and Role:     Slade Ayala MD - Primary    Consulting Physician(s): Treatment Team: Attending Provider: Shai Sparks MD; Consulting Provider: Lum Leyden, PA; Consulting Provider: Maria Antonia Santos MD; Surgeon: Raimundo Mitchell MD; Care Manager: Pascual Gonzalez.; Utilization Review: Ellen Fong RN    SUBJECTIVE:     Linn Morrison is a 34 y.o. female is 1 Day Post-Op s/p Procedure(s):  INCISION AND DRAINAGE, RIGHT HAND with an appropriate level of post-operative pain. No complaints of nausea, vomiting, dizziness, lightheadedness, chest pain, or shortness of breath. OBJECTIVE:       Physical Exam:  General: Alert, cooperative, no distress. Respiratory: Respirations unlabored  Neurological:  Neurovascular exam within normal limits. Motor: + DF/PF. Musculoskeletal: Calves soft, supple, non-tender upon palpation. Dressing/Wound:  Clean, dry and intact. No significant erythema or swelling. Cap refill less than 3 seconds. Moves fingers without difficulty. Yellow cheese wedge pillow is laying flat, propped hand where it is elevated above level of heart.      Vital Signs:        Patient Vitals for the past 8 hrs:   BP Temp Pulse Resp SpO2   19 1255 125/65 98.1 °F (36.7 °C) 69 19 100 %   19 0747 117/85 97.9 °F (36.6 °C) 70 18 99 %                                          Temp (24hrs), Av.8 °F (37.1 °C), Min:97.9 °F (36.6 °C), Max:99.9 °F (37.7 °C)      Labs:        Recent Labs     19  0409   HCT 40.2   HGB 13.3     Lab Results   Component Value Date/Time    Sodium 139 08/14/2019 04:09 AM    Potassium 4.4 08/14/2019 04:09 AM    Chloride 112 (H) 08/14/2019 04:09 AM    CO2 23 08/14/2019 04:09 AM    Glucose 163 (H) 08/14/2019 04:09 AM    BUN 11 08/14/2019 04:09 AM    Creatinine 0.83 08/14/2019 04:09 AM    Calcium 8.3 (L) 08/14/2019 04:09 AM       PT/OT:                Patient mobility                         ASSESSMENT / PLAN:   Principal Problem:    Abscess (8/13/2019)    Active Problems:    Cellulitis (8/13/2019)      Lupus ([facility] [address] (8/13/2019)      Leukocytosis (8/13/2019)      Sepsis ([facility] [address] (8/13/2019)      Tobacco use disorder (8/13/2019)                    Pain Control: Adequate with oral agents and PRN IV narcotics   DVT Prophylaxis: Up ad trevor   Therapy/ Weight bearing: WBAT. Right UE in dressing/ NWB. Keep elevated. Wound/ incisional issues: C, D & I   Medical concerns: Awaiting culture results   Disposition: Home/ TBD abx selection.       Signed By:  Miriam Valadez NP    Orthopedic Trauma Nurse Practitioner  [address] [place] [address]

## 2019-08-14 NOTE — PERIOP NOTES
TRANSFER - OUT REPORT:    Verbal report given to DILSHAD Saldana on Purvi Avilez  being transferred to room 505 for routine post - op       Report consisted of patients Situation, Background, Assessment and   Recommendations(SBAR). Information from the following report(s) SBAR, Kardex, OR Summary, Recent Results, Med Rec Status and Cardiac Rhythm ST was reviewed with the receiving nurse. Lines:   Peripheral IV 08/13/19 Left Arm (Active)   Site Assessment Clean, dry, & intact 8/13/2019 10:50 PM   Phlebitis Assessment 0 8/13/2019 10:50 PM   Infiltration Assessment 0 8/13/2019 10:50 PM   Dressing Status Clean, dry, & intact 8/13/2019 10:50 PM   Dressing Type Transparent 8/13/2019 10:50 PM   Hub Color/Line Status Pink;Patent; Flushed 8/13/2019  4:45 PM   Action Taken Blood drawn 8/13/2019  4:45 PM   Alcohol Cap Used Yes 8/13/2019  4:45 PM       Peripheral IV 08/13/19 Left Hand (Active)   Site Assessment Clean, dry, & intact 8/13/2019 10:50 PM   Phlebitis Assessment 0 8/13/2019 10:50 PM   Infiltration Assessment 0 8/13/2019 10:50 PM   Dressing Status Clean, dry, & intact; Occlusive 8/13/2019 10:50 PM   Dressing Type Transparent 8/13/2019 10:50 PM   Hub Color/Line Status Pink; Infusing 8/13/2019 10:50 PM        Opportunity for questions and clarification was provided.       Patient transported with:  2L NC and RN

## 2019-08-14 NOTE — CONSULTS
ASSESSMENT    Ortho Issues : r hand swellig/abscess    PLAN  1. Pain Control : Oral Narcotics and IV Narcotics  2. Medical Issues : r hand infection/abscess  3. Therapy : Mobilize with PT / OT Weight bearing Status : nwb rue  4. DVT Prophylaxis : Mechanical / lovenox while inpt  5. Disposition : Case Management for Home          Subjective:       Name : Mecca Ayala is a 34 y.o. female  with a history of r hand swelling x a few days, worsening, came to ed today, denies n/t, attributes to wasp bite. No other injury or prior problem has received iv abx. .     Pertinent historical findings include the following :  Mechanism wasp?    Locations : r hand  Duration : several days  Intensity : severe  Associated Symptoms : no f/c    Past Medical History :   Past Medical History:   Diagnosis Date    Asthma     Bronchitis     Lupus ([address]     Migraine     Pneumonia        Past Surgical History :   Past Surgical History:   Procedure Laterality Date    HX APPENDECTOMY      HX HYSTERECTOMY      HX KNEE ARTHROSCOPY          Medications :  See med reconciliation    Allergies :   Tetracycline and Nsaids (non-steroidal anti-inflammatory drug)        Objective  Objective:     Patient Vitals for the past 12 hrs:   BP Temp Pulse Resp SpO2 Height Weight   08/13/19 2140 109/80 99.5 °F (37.5 °C) 98 20 95 %     08/13/19 2045 111/73  92 13 96 %     08/13/19 2030 125/73  96 15 95 %     08/13/19 2021 93/72 98.9 °F (37.2 °C) 93 14 97 %     08/13/19 2000 107/68  96 13 96 %     08/13/19 1951 152/82  100 18 94 %     08/13/19 1900 (!) 131/97  (!) 104 17 97 %     08/13/19 1849   99       08/13/19 1845 117/85  (!) 107 15 94 %     08/13/19 1840 148/80 99.3 °F (37.4 °C) (!) 111 17 93 %     08/13/19 1815 (!) 150/121  (!) 106 18 95 %     08/13/19 1800 129/80  (!) 105 18 96 %     08/13/19 1730 142/90  (!) 101 16 94 %     08/13/19 1700 142/86  (!) 113 17 95 %     08/13/19 1640 136/56 99.9 °F (37.7 °C) (!) 132 16 96 % 5' 5\" (1.651 m) 68.4 kg (150 lb 12.7 oz)       Alert and Oriented x 3  No Acute Respiratory Distress  Heart - RRR  Lungs - CTAB    Focused Physical Exam :   Impressive hand swelling  Exquisite ttp dorsally, volar swelling mild tenderness  Fingers senstae  Flexor tendons intact  Flexor tendon sheaths with v'little tenderness  Wrist rom restricted, mild tenderness    No Lymphadenopathy   Palpable pulses  No skin trophic changes    Labs :   Recent Labs     08/13/19  1652   HGB 15.2   HCT 43.4      K 3.9      CO2 21   BUN 19   CREA 0.96   *       X-rays/ct  Dorsal collection c/w abscess, xr with expected soft tissue swelling, no bony erosion/fx      Elizabeth Catalan MD

## 2019-08-14 NOTE — PROGRESS NOTES
0730  Bedside and Verbal shift change report given to Rylee Abbott RN (oncoming nurse) by Shirley Garner RN (offgoing nurse). Report included the following information SBAR, Kardex, Intake/Output, MAR, Recent Results and Med Rec Status.

## 2019-08-14 NOTE — ED NOTES
Patient is now on her third antibiotic and has completed first liter of NS.  Has her eyes shut and appears calm on the stretcher

## 2019-08-14 NOTE — PROGRESS NOTES
[facility] Dosing Services: Antimicrobial Stewardship Daily Doc    Consult for antibiotic dosing of vancomycin by Dr. Todd Underwood  Indication: Right hand cellulitis associated with abscess s/p I&D on 8/13  Day of Therapy: 2    Ht Readings from Last 1 Encounters:   08/13/19 165.1 cm (65\")        Wt Readings from Last 1 Encounters:   08/13/19 68.4 kg (150 lb 12.7 oz)     Vancomycin therapy:  Current maintenance dose: 1000 mg IV every 12 hours  Dose calculated to approximate a therapeutic trough of 10-15 mcg/mL  Plan:  SCr stable, leukocytosis continues WBC 22.4, afebrile today, culture results pending  Would target trough goal closer to 15 mcg/mL due to SSTI with abscess, but she did have I&D yesterday  Continue the current empiric regimen. Will order a trough level prior to 4th dose which will be tomorrow 8/15 at 0600  Will also order SCr daily with AM labs per protocol  Dose administration notes:   Doses given appropriately as scheduled    Date Dose & Interval Measured (mcg/mL) Extrapolated (mcg/mL)   ? ? ? ?   ? ? ? ?   ? ? ? ? Other Antimicrobial   (not dosed by pharmacist) Cefepime 2 g IV every 8 hours  Metronidazole 500 mg IV every 12 hours   Cultures 8/13 - Blood, paired - NGTD x 6 hours - Prelim  8/13 - Wound - Pending  8/13 - Anaerobic - Pending   Serum Creatinine Lab Results   Component Value Date/Time    Creatinine 0.83 08/14/2019 04:09 AM           Creatinine Clearance Estimated Creatinine Clearance: 87.6 mL/min (based on SCr of 0.83 mg/dL).      Temp Temp: 97.9 °F (36.6 °C)       WBC Lab Results   Component Value Date/Time    WBC 22.4 (H) 08/14/2019 04:09 AM          H/H Lab Results   Component Value Date/Time    HGB 13.3 08/14/2019 04:09 AM          Platelets    Lab Results   Component Value Date/Time    PLATELET 020 37/14/6749 04:09 AM            For Antifungals, Metronidazole, and Nafcillin:  ALT:  55      AST:  20    Alk Phos:   78   T Bili: 0.5    Pharmacist Pola Eric, PHARMD Contact information:

## 2019-08-14 NOTE — PROGRESS NOTES
[facility]  [address] [place] [address]  [phone]      Medical Progress Note      NAME: Nigel Dodd   :  1980  MRM:  [id number]    Date/Time: 2019  12:49 PM         Subjective:     Chief Complaint:  F/u hand infection    ROS:  (bold if positive, if negative)      Tolerating PT  Tolerating Diet          Objective:       Vitals:          Last 24hrs VS reviewed since prior progress note. Most recent are:    Visit Vitals  /85 (BP 1 Location: Left arm, BP Patient Position: At rest)   Pulse 70   Temp 97.9 °F (36.6 °C)   Resp 18   Ht 5' 5\" (1.651 m)   Wt 68.4 kg (150 lb 12.7 oz)   SpO2 99%   BMI 25.09 kg/m²     SpO2 Readings from Last 6 Encounters:   19 99%    O2 Flow Rate (L/min): 2 l/min       Intake/Output Summary (Last 24 hours) at 2019 1249  Last data filed at 2019 2245  Gross per 24 hour   Intake 450 ml   Output 20 ml   Net 430 ml          Exam:     Physical Exam:    Gen:  Well-developed, well-nourished, in no acute distress  HEENT:  Pink conjunctivae, PERRL, hearing intact to voice, moist mucous membranes  Resp:  No accessory muscle use, clear breath sounds without wheezes rales or rhonchi  Card:  No murmurs, normal S1, S2 without thrills, bruits or peripheral edema  Abd:  Soft, non-tender, non-distended, normoactive bowel sounds are present  Skin:  Right hand dressing c/d/i  Neuro:  No focal deficits, follows commands appropriately  Psych:  Good insight, oriented to person, place and time, alert            Medications Reviewed: (see below)    Lab Data Reviewed: (see below)    ______________________________________________________________________    Medications:     Current Facility-Administered Medications   Medication Dose Route Frequency    [START ON 8/15/2019] Vancomycin - Please draw trough level prior to dose due on 8/15 at 0600. Thanks!    Other ONCE    sodium chloride (NS) flush 5-40 mL  5-40 mL IntraVENous Q8H    sodium chloride (NS) flush 5-40 mL  5-40 mL IntraVENous PRN    acetaminophen (TYLENOL) tablet 650 mg  650 mg Oral Q6H PRN    naloxone (NARCAN) injection 0.4 mg  0.4 mg IntraVENous PRN    lactated Ringers infusion  150 mL/hr IntraVENous CONTINUOUS    nicotine (NICODERM CQ) 14 mg/24 hr patch 1 Patch  1 Patch TransDERmal DAILY    HYDROmorphone (DILAUDID) syringe 1 mg  1 mg IntraVENous Q4H PRN    cefepime (MAXIPIME) 2 g in 0.9% sodium chloride (MBP/ADV) 100 mL  2 g IntraVENous Q8H    metroNIDAZOLE (FLAGYL) IVPB premix 500 mg  500 mg IntraVENous Q12H    vancomycin (VANCOCIN) 1,000 mg in 0.9% sodium chloride (MBP/ADV) 250 mL  1 g IntraVENous Q12H            Lab Review:     Recent Labs     08/14/19  0409 08/13/19  1652   WBC 22.4* 25.6*   HGB 13.3 15.2   HCT 40.2 43.4    324     Recent Labs     08/14/19  0409 08/13/19  1652    138   K 4.4 3.9   * 104   CO2 23 21   * 121*   BUN 11 19   CREA 0.83 0.96   CA 8.3* 9.3   MG 2.1  --    PHOS 3.0  --    ALB 3.2*  --    SGOT 20  --    ALT 55  --      No components found for: Dov Point         Assessment / Plan:      Abscess / Cellulitis: Diffuse soft tissue swelling of the hand with a rim-enhancing collection over the dorsum of the third and fourth metacarpals most consistent with abscess. Reports possible wasp sting; although there is some concern for IVDA. Sp I&D by ortho. Continue IV vanc, cefepime, and Flagyl. awaiting intro-op culturse       Leukocytosis / Sepsis: due to the above.  IV abx, IVF        Lupus ([address]): reports hx of this, but not followed by rheumatology       Tobacco use disorder: counseled on cessation      Total time spent with patient: [address] discussed with: Patient    Discussed:  Care Plan    Prophylaxis:  Lovenox    Disposition:  Home w/Family           ___________________________________________________    Attending Physician: Christiano Jamison MD

## 2019-08-15 LAB
COMMENT, HOLDF: NORMAL
DATE LAST DOSE: NORMAL
REPORTED DOSE,DOSE: NORMAL UNITS
REPORTED DOSE/TIME,TMG: NORMAL
SAMPLES BEING HELD,HOLD: NORMAL
VANCOMYCIN TROUGH SERPL-MCNC: 7.4 UG/ML (ref 5–10)

## 2019-08-15 PROCEDURE — 74011000258 HC RX REV CODE- 258: Performed by: INTERNAL MEDICINE

## 2019-08-15 PROCEDURE — 36415 COLL VENOUS BLD VENIPUNCTURE: CPT

## 2019-08-15 PROCEDURE — 74011250636 HC RX REV CODE- 250/636: Performed by: INTERNAL MEDICINE

## 2019-08-15 PROCEDURE — 80202 ASSAY OF VANCOMYCIN: CPT

## 2019-08-15 PROCEDURE — 65270000029 HC RM PRIVATE

## 2019-08-15 PROCEDURE — 94760 N-INVAS EAR/PLS OXIMETRY 1: CPT

## 2019-08-15 PROCEDURE — 74011250637 HC RX REV CODE- 250/637: Performed by: INTERNAL MEDICINE

## 2019-08-15 RX ORDER — OXYCODONE AND ACETAMINOPHEN 5; 325 MG/1; MG/1
1 TABLET ORAL
Status: DISCONTINUED | OUTPATIENT
Start: 2019-08-15 | End: 2019-08-16 | Stop reason: HOSPADM

## 2019-08-15 RX ADMIN — SODIUM CHLORIDE, SODIUM LACTATE, POTASSIUM CHLORIDE, AND CALCIUM CHLORIDE 150 ML/HR: 600; 310; 30; 20 INJECTION, SOLUTION INTRAVENOUS at 04:04

## 2019-08-15 RX ADMIN — HYDROMORPHONE HYDROCHLORIDE 1 MG: 1 INJECTION, SOLUTION INTRAMUSCULAR; INTRAVENOUS; SUBCUTANEOUS at 00:04

## 2019-08-15 RX ADMIN — HYDROMORPHONE HYDROCHLORIDE 1 MG: 1 INJECTION, SOLUTION INTRAMUSCULAR; INTRAVENOUS; SUBCUTANEOUS at 04:04

## 2019-08-15 RX ADMIN — Medication 10 ML: at 05:27

## 2019-08-15 RX ADMIN — ACETAMINOPHEN 650 MG: 325 TABLET ORAL at 00:09

## 2019-08-15 RX ADMIN — ACETAMINOPHEN 650 MG: 325 TABLET ORAL at 06:07

## 2019-08-15 RX ADMIN — VANCOMYCIN HYDROCHLORIDE 1000 MG: 1 INJECTION, POWDER, LYOPHILIZED, FOR SOLUTION INTRAVENOUS at 06:07

## 2019-08-15 RX ADMIN — VANCOMYCIN HYDROCHLORIDE 1000 MG: 1 INJECTION, POWDER, LYOPHILIZED, FOR SOLUTION INTRAVENOUS at 13:56

## 2019-08-15 RX ADMIN — ACETAMINOPHEN 650 MG: 325 TABLET ORAL at 21:02

## 2019-08-15 RX ADMIN — Medication 10 ML: at 13:53

## 2019-08-15 RX ADMIN — Medication 10 ML: at 21:05

## 2019-08-15 RX ADMIN — HYDROMORPHONE HYDROCHLORIDE 1 MG: 1 INJECTION, SOLUTION INTRAMUSCULAR; INTRAVENOUS; SUBCUTANEOUS at 08:14

## 2019-08-15 RX ADMIN — HYDROMORPHONE HYDROCHLORIDE 1 MG: 1 INJECTION, SOLUTION INTRAMUSCULAR; INTRAVENOUS; SUBCUTANEOUS at 12:07

## 2019-08-15 RX ADMIN — HYDROMORPHONE HYDROCHLORIDE 1 MG: 1 INJECTION, SOLUTION INTRAMUSCULAR; INTRAVENOUS; SUBCUTANEOUS at 16:05

## 2019-08-15 RX ADMIN — CEFEPIME 2 G: 2 INJECTION, POWDER, FOR SOLUTION INTRAVENOUS at 05:26

## 2019-08-15 RX ADMIN — VANCOMYCIN HYDROCHLORIDE 1000 MG: 1 INJECTION, POWDER, LYOPHILIZED, FOR SOLUTION INTRAVENOUS at 21:02

## 2019-08-15 RX ADMIN — HYDROMORPHONE HYDROCHLORIDE 1 MG: 1 INJECTION, SOLUTION INTRAMUSCULAR; INTRAVENOUS; SUBCUTANEOUS at 20:03

## 2019-08-15 RX ADMIN — CEFEPIME 2 G: 2 INJECTION, POWDER, FOR SOLUTION INTRAVENOUS at 12:07

## 2019-08-15 NOTE — PROGRESS NOTES
Educated patient to elevate right hand. Placed pillow under right hand. Medicated with 1 mg dilaudid.

## 2019-08-15 NOTE — PROGRESS NOTES
[facility] Dosing Services: Antimicrobial Stewardship Daily Doc    Consult for antibiotic dosing of vancomycin by Dr. Naeem Davis  Indication: Right hand cellulitis associated with abscess s/p I&D on 8/13  Day of Therapy: 3    Ht Readings from Last 1 Encounters:   08/13/19 165.1 cm (65\")        Wt Readings from Last 1 Encounters:   08/13/19 68.4 kg (150 lb 12.7 oz)     Vancomycin therapy:  Current maintenance dose: 1000 mg IV every 12 hours  Dose calculated to approximate a therapeutic trough of 10-15 mcg/mL  Plan:  No new BMP or CBC this AM, SCr was stable, leukocytosis yesterday WBC 22.4, afebrile today, culture results pending  Vancomycin trough level is sub-therapeutic. Would target trough goal closer to 15 mcg/mL due to SSTI with abscess, but she did have I&D on 8/13  Will modify regimen to 1000 mg IV every 8 hours to predict a trough of 14.8 mcg/mL. Keeping at 15 mg/kg dosing. SCr daily with AM labs ordered per protocol but was not drawn this AM?  Dose administration notes:   Doses given appropriately as scheduled    Date Dose & Interval Measured (mcg/mL) Extrapolated (mcg/mL)   ? 8/15 at 0606 ?1000 mg IV q12h ?7.4 ? 7.4   ? ? ? ?   ? ? ? ? Other Antimicrobial   (not dosed by pharmacist) Cefepime 2 g IV every 8 hours - appropriate for renal function    Metronidazole discontinued on 8/15   Cultures 8/13 - Blood, paired - NGTD x 2 days - Prelim  8/13 - Wound - Pending  8/13 - Anaerobic - Pending   Serum Creatinine Lab Results   Component Value Date/Time    Creatinine 0.83 08/14/2019 04:09 AM         Creatinine Clearance Estimated Creatinine Clearance: 87.6 mL/min (based on SCr of 0.83 mg/dL).      Temp Temp: 98 °F (36.7 °C)       WBC Lab Results   Component Value Date/Time    WBC 22.4 (H) 08/14/2019 04:09 AM        H/H Lab Results   Component Value Date/Time    HGB 13.3 08/14/2019 04:09 AM        Platelets    Lab Results   Component Value Date/Time    PLATELET 671 03/01/8158 04:09 AM          For Antifungals, Metronidazole, and Nafcillin:  ALT:  55      AST:  20    Alk Phos:   78   T Bili: 0.5    Pharmacist ANNABEL Chung Contact information:

## 2019-08-15 NOTE — PROGRESS NOTES
2200  Pt states she feels nauseous. Dr. Malathi Zaman stated orders were to follow. Will continue to monitor. 0730  Bedside and Verbal shift change report given to [address], RN (oncoming nurse) by Violeta Ramos RN (offgoing nurse).  Report included the following information SBAR, Kardex, Intake/Output, MAR, Recent Results and Med Rec Status.

## 2019-08-15 NOTE — PROGRESS NOTES
[facility]  [address]  [phone]      Medical Progress Note      NAME: Abdoulaye Franco   :  1980  MRM:  [id number]    Date/Time: 8/15/2019  12:49 PM         Subjective:     Chief Complaint:  F/u hand infection    ROS:  (bold if positive, if negative)      Tolerating PT  Tolerating Diet          Objective:       Vitals:          Last 24hrs VS reviewed since prior progress note.  Most recent are:    Visit Vitals  /70 (BP 1 Location: Left arm, BP Patient Position: Sitting)   Pulse 76   Temp 98.4 °F (36.9 °C)   Resp 17   Ht 5' 5\" (1.651 m)   Wt 68.4 kg (150 lb 12.7 oz)   SpO2 95%   BMI 25.09 kg/m²     SpO2 Readings from Last 6 Encounters:   08/15/19 95%    O2 Flow Rate (L/min): 2 l/min       Intake/Output Summary (Last 24 hours) at 8/15/2019 1422  Last data filed at 8/15/2019 6813  Gross per 24 hour   Intake 6637.5 ml   Output    Net 6637.5 ml          Exam:     Physical Exam:    Gen:  Well-developed, well-nourished, in no acute distress  HEENT:  Pink conjunctivae, PERRL, hearing intact to voice, moist mucous membranes  Resp:  No accessory muscle use, clear breath sounds without wheezes rales or rhonchi  Card:  No murmurs, normal S1, S2 without thrills, bruits or peripheral edema  Abd:  Soft, non-tender, non-distended, normoactive bowel sounds are present  Skin:  Right hand dressing c/d/i  Neuro:  No focal deficits, follows commands appropriately  Psych:  Good insight, oriented to person, place and time, alert            Medications Reviewed: (see below)    Lab Data Reviewed: (see below)    ______________________________________________________________________    Medications:     Current Facility-Administered Medications   Medication Dose Route Frequency    vancomycin (VANCOCIN) 1,000 mg in 0.9% sodium chloride (MBP/ADV) 250 mL  1 g IntraVENous Q8H    [START ON 2019] Vancomycin - Please draw trough level prior to dose due on  at 0600. Thanks! Other ONCE    oxyCODONE-acetaminophen (PERCOCET) 5-325 mg per tablet 1 Tab  1 Tab Oral Q4H PRN    ondansetron (ZOFRAN) injection 4 mg  4 mg IntraVENous Q6H PRN    sodium chloride (NS) flush 5-40 mL  5-40 mL IntraVENous Q8H    sodium chloride (NS) flush 5-40 mL  5-40 mL IntraVENous PRN    acetaminophen (TYLENOL) tablet 650 mg  650 mg Oral Q6H PRN    naloxone (NARCAN) injection 0.4 mg  0.4 mg IntraVENous PRN    lactated Ringers infusion  100 mL/hr IntraVENous CONTINUOUS    nicotine (NICODERM CQ) 14 mg/24 hr patch 1 Patch  1 Patch TransDERmal DAILY    HYDROmorphone (DILAUDID) syringe 1 mg  1 mg IntraVENous Q4H PRN            Lab Review:     Recent Labs     08/14/19  0409 08/13/19  1652   WBC 22.4* 25.6*   HGB 13.3 15.2   HCT 40.2 43.4    324     Recent Labs     08/14/19  0409 08/13/19  1652    138   K 4.4 3.9   * 104   CO2 23 21   * 121*   BUN 11 19   CREA 0.83 0.96   CA 8.3* 9.3   MG 2.1  --    PHOS 3.0  --    ALB 3.2*  --    SGOT 20  --    ALT 55  --      No components found for: Dov Point         Assessment / Plan:      Abscess / Cellulitis: Diffuse soft tissue swelling of the hand with a rim-enhancing collection over the dorsum of the third and fourth metacarpals most consistent with abscess. Reports possible wasp sting; although there is some concern for IVDA. Sp I&D by ortho. Cultures growing staph, awaiting sensitivities. Narrow abx to vanc. Continue IV dilaudid, add percocet for pain control       Leukocytosis / Sepsis: due to the above.  IV abx, IVF        Lupus ([facility] [address]): reports hx of this, but not followed by rheumatology       Tobacco use disorder: counseled on cessation      Total time spent with patient: 30 [address] [facility] discussed with: Patient    Discussed:  Care Plan    Prophylaxis:  Lovenox    Disposition:  Home w/Family           ___________________________________________________    Attending Physician: Leo Moreira MD

## 2019-08-15 NOTE — ROUTINE PROCESS
Bedside and Verbal shift change report given to Nayely Armendariz (oncoming nurse) by Ton Harris (offgoing nurse).  Report included the following information SBAR, Kardex, MAR, Accordion and Recent Results. '

## 2019-08-15 NOTE — PROGRESS NOTES
Interdisciplinary team rounds were held 8/15/2019 with the following team members:Care Management, Nursing, PTOT, Pharmacy, Nutrition and Physician. Plan of care discussed. See clinical pathway and/or care plan for interventions and desired outcomes. Awaiting culture results.

## 2019-08-15 NOTE — PROGRESS NOTES
Orthopaedic Progress Note  Post Op day: 2 Days Post-Op    August 15, 2019 1:36 PM     Patient: Chava Muñiz MRN: [id number]  SSN: [id number]    YOB: 1980  Age: 34 y.o. Sex: female      Admit date:  2019  Date of Surgery:  2019   Procedures:  Procedure(s):  INCISION AND DRAINAGE, RIGHT HAND  Admitting Physician:  Nae Ram MD   Surgeon:  Irasema Mcclure) and Role:     Diego Chairez MD - Primary    Consulting Physician(s): Treatment Team: Attending Provider: Raphael Sofia MD; Consulting Provider: KRYSTYNA Shelley; Consulting Provider: Susanna Devries MD; Surgeon: Brenna Bone MD; Care Manager: Eusebia Rice; Utilization Review: Uriel Perkisn RN    SUBJECTIVE:     Chava Muñiz is a 34 y.o. female is 2 Days Post-Op s/p Procedure(s):  INCISION AND DRAINAGE, RIGHT HAND with an appropriate level of post-operative pain. No complaints of nausea, vomiting, dizziness, lightheadedness, chest pain, or shortness of breath. OBJECTIVE:       Physical Exam:  General: Alert, cooperative, no distress. Respiratory: Respirations unlabored  Neurological:  Neurovascular exam within normal limits. Motor: + DF/PF. Musculoskeletal: Calves soft, supple, non-tender upon palpation. Dressing/Wound:  Clean, dry and intact. No significant erythema or swelling.       Vital Signs:        Patient Vitals for the past 8 hrs:   BP Temp Pulse Resp SpO2   08/15/19 1125 121/70 98.4 °F (36.9 °C) 76 17 95 %   08/15/19 0715 135/84 98 °F (36.7 °C) (!) 56 17 98 %                                          Temp (24hrs), Av.1 °F (36.7 °C), Min:97.9 °F (36.6 °C), Max:98.4 °F (36.9 °C)      Labs:        Recent Labs     19  0409   HCT 40.2   HGB 13.3     Lab Results   Component Value Date/Time    Sodium 139 2019 04:09 AM    Potassium 4.4 2019 04:09 AM    Chloride 112 (H) 2019 04:09 AM    CO2 23 2019 04:09 AM    Glucose 163 (H) 2019 04:09 AM BUN 11 08/14/2019 04:09 AM    Creatinine 0.83 08/14/2019 04:09 AM    Calcium 8.3 (L) 08/14/2019 04:09 AM       PT/OT:                Patient mobility                         ASSESSMENT / PLAN:   Principal Problem:    Abscess (8/13/2019)    Active Problems:    Cellulitis (8/13/2019)      Lupus ([facility] [address] (8/13/2019)      Leukocytosis (8/13/2019)      Sepsis ([facility] [address] (8/13/2019)      Tobacco use disorder (8/13/2019)                    Pain Control: Adequate with oral agents    DVT Prophylaxis: ambulate   Therapy/ Weight bearing:    Wound/ incisional issues: Dressing changed today. Edema improved. NVI distally all fingers, Motor intact. 2 penrose drains removed. Cleaned with betadine and xeroform, bulky dressing applied with volar splint. Medical concerns:    Disposition: Home w/ Family when cleared by medicine on abx. Keep elevated in cheeseblock  Dr. Mishel Cutler agrees with plan.       Signed By:  Audrey Gilman PA-C    [facility] PA  [address] [place] [address]

## 2019-08-16 VITALS
WEIGHT: 150.79 LBS | HEART RATE: 69 BPM | BODY MASS INDEX: 25.12 KG/M2 | RESPIRATION RATE: 16 BRPM | HEIGHT: 65 IN | SYSTOLIC BLOOD PRESSURE: 137 MMHG | OXYGEN SATURATION: 100 % | DIASTOLIC BLOOD PRESSURE: 74 MMHG | TEMPERATURE: 97.8 F

## 2019-08-16 PROBLEM — L02.519 ABSCESS OF HAND: Status: ACTIVE | Noted: 2019-08-16

## 2019-08-16 LAB
ANION GAP SERPL CALC-SCNC: 3 MMOL/L (ref 5–15)
BACTERIA SPEC CULT: ABNORMAL
BUN SERPL-MCNC: 11 MG/DL (ref 6–20)
BUN/CREAT SERPL: 10 (ref 12–20)
CALCIUM SERPL-MCNC: 7.8 MG/DL (ref 8.5–10.1)
CHLORIDE SERPL-SCNC: 112 MMOL/L (ref 97–108)
CO2 SERPL-SCNC: 27 MMOL/L (ref 21–32)
CREAT SERPL-MCNC: 1.07 MG/DL (ref 0.55–1.02)
DATE LAST DOSE: ABNORMAL
ERYTHROCYTE [DISTWIDTH] IN BLOOD BY AUTOMATED COUNT: 13.2 % (ref 11.5–14.5)
GLUCOSE SERPL-MCNC: 91 MG/DL (ref 65–100)
GRAM STN SPEC: ABNORMAL
GRAM STN SPEC: ABNORMAL
HCT VFR BLD AUTO: 35.8 % (ref 35–47)
HGB BLD-MCNC: 12.1 G/DL (ref 11.5–16)
MAGNESIUM SERPL-MCNC: 2.1 MG/DL (ref 1.6–2.4)
MCH RBC QN AUTO: 30.5 PG (ref 26–34)
MCHC RBC AUTO-ENTMCNC: 33.8 G/DL (ref 30–36.5)
MCV RBC AUTO: 90.2 FL (ref 80–99)
NRBC # BLD: 0 K/UL (ref 0–0.01)
NRBC BLD-RTO: 0 PER 100 WBC
PHOSPHATE SERPL-MCNC: 2.3 MG/DL (ref 2.6–4.7)
PLATELET # BLD AUTO: 293 K/UL (ref 150–400)
PMV BLD AUTO: 9.7 FL (ref 8.9–12.9)
POTASSIUM SERPL-SCNC: 3.7 MMOL/L (ref 3.5–5.1)
RBC # BLD AUTO: 3.97 M/UL (ref 3.8–5.2)
REPORTED DOSE,DOSE: ABNORMAL UNITS
REPORTED DOSE/TIME,TMG: ABNORMAL
SERVICE CMNT-IMP: ABNORMAL
SODIUM SERPL-SCNC: 142 MMOL/L (ref 136–145)
VANCOMYCIN TROUGH SERPL-MCNC: 23.2 UG/ML (ref 5–10)
WBC # BLD AUTO: 10.9 K/UL (ref 3.6–11)

## 2019-08-16 PROCEDURE — 83735 ASSAY OF MAGNESIUM: CPT

## 2019-08-16 PROCEDURE — 80202 ASSAY OF VANCOMYCIN: CPT

## 2019-08-16 PROCEDURE — 84100 ASSAY OF PHOSPHORUS: CPT

## 2019-08-16 PROCEDURE — 74011250636 HC RX REV CODE- 250/636: Performed by: INTERNAL MEDICINE

## 2019-08-16 PROCEDURE — 94760 N-INVAS EAR/PLS OXIMETRY 1: CPT

## 2019-08-16 PROCEDURE — 85027 COMPLETE CBC AUTOMATED: CPT

## 2019-08-16 PROCEDURE — 80048 BASIC METABOLIC PNL TOTAL CA: CPT

## 2019-08-16 PROCEDURE — 36415 COLL VENOUS BLD VENIPUNCTURE: CPT

## 2019-08-16 PROCEDURE — 74011250637 HC RX REV CODE- 250/637: Performed by: INTERNAL MEDICINE

## 2019-08-16 RX ORDER — AMOXICILLIN AND CLAVULANATE POTASSIUM 875; 125 MG/1; MG/1
1 TABLET, FILM COATED ORAL EVERY 12 HOURS
Qty: 20 TAB | Refills: 0 | Status: SHIPPED | OUTPATIENT
Start: 2019-08-16 | End: 2019-08-26

## 2019-08-16 RX ORDER — OXYCODONE AND ACETAMINOPHEN 5; 325 MG/1; MG/1
1 TABLET ORAL
Qty: 10 TAB | Refills: 0 | Status: SHIPPED | OUTPATIENT
Start: 2019-08-16 | End: 2019-08-19

## 2019-08-16 RX ADMIN — OXYCODONE HYDROCHLORIDE AND ACETAMINOPHEN 1 TABLET: 5; 325 TABLET ORAL at 08:51

## 2019-08-16 RX ADMIN — Medication 10 ML: at 05:04

## 2019-08-16 RX ADMIN — HYDROMORPHONE HYDROCHLORIDE 1 MG: 1 INJECTION, SOLUTION INTRAMUSCULAR; INTRAVENOUS; SUBCUTANEOUS at 04:20

## 2019-08-16 RX ADMIN — VANCOMYCIN HYDROCHLORIDE 1000 MG: 1 INJECTION, POWDER, LYOPHILIZED, FOR SOLUTION INTRAVENOUS at 05:02

## 2019-08-16 RX ADMIN — OXYCODONE HYDROCHLORIDE AND ACETAMINOPHEN 1 TABLET: 5; 325 TABLET ORAL at 05:02

## 2019-08-16 RX ADMIN — SODIUM CHLORIDE, SODIUM LACTATE, POTASSIUM CHLORIDE, AND CALCIUM CHLORIDE 50 ML/HR: 600; 310; 30; 20 INJECTION, SOLUTION INTRAVENOUS at 08:54

## 2019-08-16 RX ADMIN — HYDROMORPHONE HYDROCHLORIDE 1 MG: 1 INJECTION, SOLUTION INTRAMUSCULAR; INTRAVENOUS; SUBCUTANEOUS at 00:17

## 2019-08-16 NOTE — PROGRESS NOTES
Pharmacist Discharge Medication Reconciliation    Discharge Provider:  Dr. Aria Hassan       Discharge Medications:      My Medications        START taking these medications        Instructions Each Dose to Equal Morning Noon Evening Bedtime   amoxicillin-clavulanate 875-125 mg per tablet  Commonly known as:  AUGMENTIN    Your last dose was: Your next dose is: Take 1 Tab by mouth every twelve (12) hours for 10 days. 1 Tab                 oxyCODONE-acetaminophen 5-325 mg per tablet  Commonly known as:  PERCOCET    Your last dose was: Your next dose is: Take 1 Tab by mouth every six (6) hours as needed for Pain for up to 3 days. Max Daily Amount: 4 Tabs. 1 Tab                           Where to Get Your Medications        Information on where to get these meds will be given to you by the nurse or doctor.     Ask your nurse or doctor about these medications  amoxicillin-clavulanate 875-125 mg per tablet  oxyCODONE-acetaminophen 5-325 mg per tablet               The patient's chart, MAR, and AVS were reviewed by   Bailey Virgen PharmD, BCPS  Contact: [phone]

## 2019-08-16 NOTE — PROGRESS NOTES
Bedside shift change report given to Luz Maria (oncoming nurse) by Gisel Brunner (offgoing nurse). Report included the following information SBAR, Kardex, Intake/Output, MAR and Recent Results.

## 2019-08-16 NOTE — PROGRESS NOTES
Vancomycin trough, drawn 3.5 hours post-dose, was 23.2 mcg/ml. This extrapolates to 12.8 mcg/ml with an AUC:ROXANNA of 443. This is a therapeutic result. Continue same regimen.

## 2019-08-16 NOTE — DISCHARGE SUMMARY
Genaro Pérez [facility] [address]  [address]  [phone]    Physician Discharge Summary     Patient ID:  Trevor Asher  [id number]  63 y.o.  1980    Admit date: 8/13/2019    Discharge date and time: 8/16/2019    Admission Diagnoses: Abscess [L02.91]    Discharge Diagnoses:  Principal Diagnosis Abscess of hand                                            Principal Problem:    Abscess of hand (8/16/2019)    Active Problems:    Abscess (8/13/2019)      Cellulitis (8/13/2019)      Lupus ([facility] [address] (8/13/2019)      Leukocytosis (8/13/2019)      Sepsis ([facility] [address] (8/13/2019)      Tobacco use disorder (8/13/2019)           Hospital Course:     33 yo hx of SLE, presented w/ R hand cellulitis/abscess. Ortho performed I&D on 08/13. Cx grew staph and gram neg. Patient improved on IV Vanc/cefepime. Will complete a course of augmentin. Patient will f/u with Ortho, Dr. Rose Mary Nice. Wound care per ortho     PCP: None     Consults: Orthopedic Surgery    Significant Diagnostic Studies: I&D    Discharge Exam:  Physical Exam:    Gen: Well-developed, well-nourished, in no acute distress  HEENT:  Pink conjunctivae, PERRL, hearing intact to voice, moist mucous membranes  Neck: Supple, without masses, thyroid non-tender  Resp: No accessory muscle use, clear breath sounds without wheezes rales or rhonchi  Card: No murmurs, normal S1, S2 without thrills, bruits or peripheral edema  Abd:  Soft, non-tender, non-distended, normoactive bowel sounds are present   Lymph:  No cervical or inguinal adenopathy  Musc: No cyanosis or clubbing  Skin: Right hand dressing c/d/i.   Mild swelling  Neuro:  Cranial nerves are grossly intact, no focal motor weakness, follows commands appropriately  Psych:  Good insight, oriented to person, place and time, alert    Disposition: home  Discharge Condition: Stable    Patient Instructions:   Current Discharge Medication List      START taking these medications    Details oxyCODONE-acetaminophen (PERCOCET) 5-325 mg per tablet Take 1 Tab by mouth every six (6) hours as needed for Pain for up to 3 days. Max Daily Amount: 4 Tabs. Qty: 10 Tab, Refills: 0    Associated Diagnoses: Cellulitis and abscess of hand      amoxicillin-clavulanate (AUGMENTIN) 875-125 mg per tablet Take 1 Tab by mouth every twelve (12) hours for 10 days.   Qty: 20 Tab, Refills: 0           Activity: See surgical instructions  Diet: Regular Diet  Wound Care: As directed    Follow-up with  Follow-up Information     Follow up With Specialties Details Why Contact Info    Tello Weston MD Orthopedic Surgery Schedule an appointment as soon as possible for a visit in 1 week  [address] [place] [address]            Follow-up tests/labs none    Signed:  Yordan Roberto MD  8/16/2019  10:05 AM    I spent 31 min on discharge

## 2019-08-16 NOTE — DISCHARGE INSTRUCTIONS
HOSPITALIST DISCHARGE INSTRUCTIONS  NAME: Guanaco Pop   :  1980   MRN:  [id number]     Date/Time:  2019 10:04 AM    ADMIT DATE: 2019     DISCHARGE DATE: 2019     ADMITTING DIAGNOSIS:  Right hand cellulitis and abscess    DISCHARGE DIAGNOSIS:  same    MEDICATIONS:  See after visit summary       · It is important that you take the medication exactly as they are prescribed. · Keep your medication in the bottles provided by the pharmacist and keep a list of the medication names, dosages, and times to be taken in your wallet. · Do not take other medications without consulting your doctor     Pain Management: per above medications    What to do at Home    Recommended diet:  Regular Diet    Recommended activity: Activity as tolerated    1) Return to the hospital if you feel worse    2) If you experience any of the following symptoms then please call your primary care physician or return to the emergency room if you cannot get hold of your doctor:  Fever, chills, nausea, vomiting, diarrhea, change in mentation, falling, bleeding, shortness of breath, chest pain, severe headache, severe abdominal pain,     3) Follow up with orthopedics. Follows wound care per their instructions    Follow Up: Follow-up Information     Follow up With Specialties Details Why Contact Darrell Montenegro MD Orthopedic Surgery Schedule an appointment as soon as possible for a visit in 1 week  [address]              Information obtained by :  I understand that if any problems occur once I am at home I am to contact my physician. I understand and acknowledge receipt of the instructions indicated above.                                                                                                                                            Physician's or R.N.'s Signature                                                                  Date/Time Patient or Representative Signature                                                          Date/Time      Patient Education        Skin Abscess: Care Instructions  Your Care Instructions    A skin abscess is a bacterial infection that forms a pocket of pus. A boil is a kind of skin abscess. The doctor may have cut an opening in the abscess so that the pus can drain out. You may have gauze in the cut so that the abscess will stay open and keep draining. You may need antibiotics. You will need to follow up with your doctor to make sure the infection has gone away. The doctor has checked you carefully, but problems can develop later. If you notice any problems or new symptoms, get medical treatment right away. Follow-up care is a key part of your treatment and safety. Be sure to make and go to all appointments, and call your doctor if you are having problems. It's also a good idea to know your test results and keep a list of the medicines you take. How can you care for yourself at home? · Apply warm and dry compresses, a heating pad set on low, or a hot water bottle 3 or 4 times a day for pain. Keep a cloth between the heat source and your skin. · If your doctor prescribed antibiotics, take them as directed. Do not stop taking them just because you feel better. You need to take the full course of antibiotics. · Take pain medicines exactly as directed. ? If the doctor gave you a prescription medicine for pain, take it as prescribed. ? If you are not taking a prescription pain medicine, ask your doctor if you can take an over-the-counter medicine. · Keep your bandage clean and dry. Change the bandage whenever it gets wet or dirty, or at least one time a day. · If the abscess was packed with gauze:  ? Keep follow-up appointments to have the gauze changed or removed.  If the doctor instructed you to remove the gauze, follow the instructions you were given for how to remove it. ? After the gauze is removed, soak the area in warm water for 15 to 20 minutes 2 times a day, until the wound closes. When should you call for help? Call your doctor now or seek immediate medical care if:    · You have signs of worsening infection, such as:  ? Increased pain, swelling, warmth, or redness. ? Red streaks leading from the infected skin. ? Pus draining from the wound. ? A fever.    Watch closely for changes in your health, and be sure to contact your doctor if:    · You do not get better as expected. Where can you learn more? Go to http://fortunato-yisel.info/. Enter G207 in the search box to learn more about \"Skin Abscess: Care Instructions. \"  Current as of: April 1, 2019  Content Version: 12.1  © 3981-2660 [employer]. Care instructions adapted under license by [employer] (which disclaims liability or warranty for this information). If you have questions about a medical condition or this instruction, always ask your healthcare professional. [place] [address] any warranty or liability for your use of this information. Upper Extremity Surgery Discharge Instructions  Dr. Karen Rojas    Please take the time to review the following instructions before you leave the hospital and use them as guidelines during your recovery from surgery. If you have any questions, you may contact my office at [phone]. Wound Care / Dressing Change    Do NOT remove your dressing or get them wet. Kelvin Profit / Bathing    May bathe/shower as long as dressing/splint/cast is kept dry    Sling    Keep your arm in the immobilizer/sling at all times except when showering and changing your clothes. When showering or changing, keep your arm at your side. Do not move it away from your body. Activity    No lifting with your affected arm.     Please begin using fingers immediately after surgery, working to improve motion of straightening and flexing your fingers several times per day. No driving until further notice. Ice and Elevation    Continue ice consistently for 48 hours after surgery. After 48 hours, you should ice 3 times per day for 20 minutes at a time for the next 5 days. After 1 week from surgery, you may use ice as needed for pain. Diet    You may advance your regular diet as tolerated. Increase your clear liquid intake for the next 2-3 days. Medications    1. You will be given prescriptions for pain medication, and nausea when you are discharged from the hospital. Please use the medications as prescribed. Pain medications may cause constipation - over the counter Colace or Milk of Magnesia may be used as needed. Other possible side effects of pain medications are dizziness, headache, nausea, vomiting, and urinary retention. Discontinue the pain medication if you develop itching, rash, shortness of breath, or difficulties swallowing. If these symptoms become severe or arent relieved by discontinuing the medication, you should seek immediate medical attention. 2. Refills of pain medication are authorized during office hours only (8AM - 5PM Monday through Friday)  3. If you pain medication prescribed at the time of surgery contains Tylenol/Acetaminophen, DO NOT TAKE additional Tylenol/Acetaminophen. Do not exceed 4000mg of Tylenol/Acetaminophen per day. 4. You may resume the medication you were taking prior to your surgery. Pain medication may change the effects of any antidepressant medication you may be taking. If you have any questions about possible interactions between your regular medication and the pain medication, you should consult the physician who prescribes your regular medications. 5. Do not drive until further notice. 6. You were prescribed a nausea medication.   It is only necessary to fill this if you are experiencing nausea. Important Signs and Symptoms    Please call Dr. Loco Cruz office at [phone] if you have any increasing numbness or tingling, increasing drainage on your dressing, fever greater than 100.5 degrees F or pain not controlled by medications. If you are experiencing chest pain or shortness of breath, please alert your primary care physician immediately.

## 2019-08-16 NOTE — PROGRESS NOTES
CM Note:  Pt to d/c to home today transported by her . She will f/u with Dr. Serene Reddy on M/T for a wound check. No home health.   DILSHAD Holcomb

## 2019-08-16 NOTE — PROGRESS NOTES
Bedside shift change report given to Luz Maria (oncoming nurse) by Chico Aparicio (offgoing nurse). Report included the following information SBAR, Kardex, Procedure Summary, Intake/Output, MAR and Recent Results.

## 2019-08-16 NOTE — PROGRESS NOTES
Orthopaedic Progress Note  Post Op day: 3 Days Post-Op    2019 10:31 AM     Patient: Paul Morris MRN: [id number]  SSN: [id number]    YOB: 1980  Age: 34 y.o. Sex: female      Admit date:  2019  Date of Surgery:  2019   Procedures:  Procedure(s):  INCISION AND DRAINAGE, RIGHT HAND  Admitting Physician:  Niko Harrington MD   Surgeon:  Susan Pascual) and Role:     Yamileth Marr MD - Primary    Consulting Physician(s): Treatment Team: Attending Provider: Luciano Coronado MD; Consulting Provider: KRYSTYNA Montoya; Consulting Provider: Chino Hay MD; Surgeon: Lizabeth Perkins MD; Care Manager: Samantha Franks.; Utilization Review: Dale Delacruz RN; Primary Nurse: Jacqueline Mott; Staff Nurse: Kishor VALE    SUBJECTIVE:     Paul Morris is a 34 y.o. female is 3 Days Post-Op s/p Procedure(s):  INCISION AND DRAINAGE, RIGHT HAND with an appropriate level of post-operative pain. No complaints of nausea, vomiting, dizziness, lightheadedness, chest pain, or shortness of breath. OBJECTIVE:       Physical Exam:  General: Alert, cooperative, no distress. Respiratory: Respirations unlabored  Neurological:  Neurovascular exam within normal limits. Motor: + DF/PF. Musculoskeletal: Calves soft, supple, non-tender upon palpation. Dressing/Wound:  Clean, dry and intact. Edema expanding to the right elbow. There is no erythema. No pain with active or passive motion of the right elbow.       Vital Signs:        Patient Vitals for the past 8 hrs:   BP Temp Pulse Resp SpO2   19 0741 137/74 97.8 °F (36.6 °C) 69 16 100 %   19 0300 127/82 97.6 °F (36.4 °C) 66 16 99 %                                          Temp (24hrs), Av.1 °F (36.7 °C), Min:97.6 °F (36.4 °C), Max:98.8 °F (37.1 °C)      Labs:        Recent Labs     19  0032   HCT 35.8   HGB 12.1     Lab Results   Component Value Date/Time    Sodium 142 2019 12:32 AM Potassium 3.7 08/16/2019 12:32 AM    Chloride 112 (H) 08/16/2019 12:32 AM    CO2 27 08/16/2019 12:32 AM    Glucose 91 08/16/2019 12:32 AM    BUN 11 08/16/2019 12:32 AM    Creatinine 1.07 (H) 08/16/2019 12:32 AM    Calcium 7.8 (L) 08/16/2019 12:32 AM       PT/OT:                Patient mobility                         ASSESSMENT / PLAN:   Principal Problem:    Abscess of hand (8/16/2019)    Active Problems:    Abscess (8/13/2019)      Cellulitis (8/13/2019)      Lupus ([facility] [address] (8/13/2019)      Leukocytosis (8/13/2019)      Sepsis ([facility] [address] (8/13/2019)      Tobacco use disorder (8/13/2019)                    Medical Concerns:  Strep and staph growing on culture. Needs 7 days of PO antibiotics- Augmentin PO BID. Pain Control: Oral analgesics- would avoid narcotics if possible. DVT Prophylaxis: SCDs. No need for outpatient DVT ppx. Hemodynamics: Stable. Activity: Finger ROM as tolerated. Elbow ROM as tolerated. Encourage ROM to prevent further edema. Disposition: Home w/ Family when cleared by therapy. Followup with Dr. Adriana Arredondo on Monday or Tuesday for wound check.        Signed By:  Caleb Crocker, [address] [place] [address]

## 2019-08-18 LAB
BACTERIA SPEC CULT: ABNORMAL
SERVICE CMNT-IMP: ABNORMAL

## 2019-08-19 LAB
BACTERIA SPEC CULT: NORMAL
SERVICE CMNT-IMP: NORMAL

## 2024-08-11 NOTE — CONSULTS
ORTHOPEDIC CONSULT    Subjective:     Date of Consultation:  August 13, 2019    Referring Physician:  Dr. Kiarra Armas is a 34 y.o. left hand dominant female who is being seen for a right dorsal hand abscess. She has a past medical history of lupus and tobacco abuse. She presents from home complaining of right dorsal hand pain. She reports she was bit by a wasp on the dorsum of her hand. She states she locked the wasp in a room and hit a wall with her hand at the same time. She reports 10/10 pain over the dorsum of the hand. She reports worsening swelling and pain over the dorsum of the hand that has progressed for the past 12 hours. She denies fever and chills. Patient Active Problem List    Diagnosis Date Noted    Abscess 08/13/2019    Cellulitis 08/13/2019    Lupus ([facility] 08/13/2019    Leukocytosis 08/13/2019    Sepsis ([facility] 08/13/2019    Tobacco use disorder 08/13/2019     History reviewed. No pertinent family history.    Social History     Tobacco Use    Smoking status: Current Every Day Smoker     Packs/day: 0.50    Smokeless tobacco: Never Used   Substance Use Topics    Alcohol use: Yes     Frequency: Never     Comment: rarely once every 6 months     Past Medical History:   Diagnosis Date    Asthma     Bronchitis     Lupus ([facility] [address]     Migraine     Pneumonia       Past Surgical History:   Procedure Laterality Date    HX APPENDECTOMY      HX HYSTERECTOMY      HX KNEE ARTHROSCOPY        Prior to Admission medications    Not on File     Current Facility-Administered Medications   Medication Dose Route Frequency    vancomycin (VANCOCIN) 1,000 mg in 0.9% sodium chloride (MBP/ADV) 250 mL  1,000 mg IntraVENous NOW    sodium chloride (NS) flush 5-40 mL  5-40 mL IntraVENous Q8H    sodium chloride (NS) flush 5-40 mL  5-40 mL IntraVENous PRN    acetaminophen (TYLENOL) tablet 650 mg  650 mg Oral Q6H PRN    naloxone (NARCAN) injection 0.4 mg  0.4 mg IntraVENous PRN    metroNIDAZOLE (FLAGYL) IVPB premix 500 mg  500 mg IntraVENous NOW    cefepime (MAXIPIME) 2 g in 0.9% sodium chloride (MBP/ADV) 100 mL  2 g IntraVENous NOW     No current outpatient medications on file. Allergies   Allergen Reactions    Tetracycline Unknown (comments)     Can't remember        Review of Systems:  Pertinent items are noted in HPI. Objective:     Patient Vitals for the past 8 hrs:   BP Temp Pulse Resp SpO2 Height Weight   19 1849   99       19 1840 148/80 99.3 °F (37.4 °C) (!) 111 17 93 %     19 1815 (!) 150/121  (!) 106 18 95 %     19 1800 129/80  (!) 105 18 96 %     19 1730 142/90  (!) 101 16 94 %     19 1700 142/86  (!) 113 17 95 %     19 1640 136/56 99.9 °F (37.7 °C) (!) 132 16 96 % 5' 5\" (1.651 m) 68.4 kg (150 lb 12.7 oz)     Temp (24hrs), Av.6 °F (37.6 °C), Min:99.3 °F (37.4 °C), Max:99.9 °F (37.7 °C)        EXAM: GEN: Well appearing female in NAD  PSYCH:  AAO x 4  MUSC: Right hand with severe edema circumferential.  There is edema to the mid-forearm. There is mild erythema over the dorsum of the hand. She has an obvious fluid collection over dorsum of the hand. There is dorsal blistering that is starting. There is no drainage. IMAGING:  FINDINGS:     Diffuse soft tissue swelling of the wrist and hand particularly along the  dorsum. No evidence of acute fracture. There is however a rim-enhancing  low-density fluid collection abutting the dorsal surface of the mid to distal  third metacarpal and fourth metacarpal shafts. Collection measures 2.9 x 1.0 x  2.1 cm. No osseous destruction. No radiopaque foreign body.     IMPRESSION  IMPRESSION:     Diffuse soft tissue swelling of the hand with a rim-enhancing collection over  the dorsum of the third and fourth metacarpals most consistent with abscess. No  acute fracture.     Data Review   Recent Results (from the past 24 hour(s))   CBC WITH AUTOMATED DIFF    Collection Time: 19 4:52 PM   Result Value Ref Range    WBC 25.6 (H) 3.6 - 11.0 K/uL    RBC 4.87 3.80 - 5.20 M/uL    HGB 15.2 11.5 - 16.0 g/dL    HCT 43.4 35.0 - 47.0 %    MCV 89.1 80.0 - 99.0 FL    MCH 31.2 26.0 - 34.0 PG    MCHC 35.0 30.0 - 36.5 g/dL    RDW 13.8 11.5 - 14.5 %    PLATELET 272 766 - 482 K/uL    MPV 9.4 8.9 - 12.9 FL    NEUTROPHILS 70 32 - 75 %    BAND NEUTROPHILS 1 %    LYMPHOCYTES 23 12 - 49 %    MONOCYTES 6 5 - 13 %    EOSINOPHILS 0 0 - 7 %    BASOPHILS 0 0 - 1 %    IMMATURE GRANULOCYTES 0 0.0 - 0.5 %    ABS. NEUTROPHILS 18.2 (H) 1.8 - 8.0 K/UL    ABS. LYMPHOCYTES 5.9 (H) 0.8 - 3.5 K/UL    ABS. MONOCYTES 1.5 (H) 0.0 - 1.0 K/UL    ABS. EOSINOPHILS 0.0 0.0 - 0.4 K/UL    ABS. BASOPHILS 0.0 0.0 - 0.1 K/UL    ABS. IMM. GRANS. 0.0 0.00 - 0.04 K/UL    DF MANUAL      RBC COMMENTS NORMOCYTIC, NORMOCHROMIC     METABOLIC PANEL, BASIC    Collection Time: 08/13/19  4:52 PM   Result Value Ref Range    Sodium 138 136 - 145 mmol/L    Potassium 3.9 3.5 - 5.1 mmol/L    Chloride 104 97 - 108 mmol/L    CO2 21 21 - 32 mmol/L    Anion gap 13 5 - 15 mmol/L    Glucose 121 (H) 65 - 100 mg/dL    BUN 19 6 - 20 MG/DL    Creatinine 0.96 0.55 - 1.02 MG/DL    BUN/Creatinine ratio 20 12 - 20      GFR est AA >60 >60 ml/min/1.73m2    GFR est non-AA >60 >60 ml/min/1.73m2    Calcium 9.3 8.5 - 10.1 MG/DL   POC LACTIC ACID    Collection Time: 08/13/19  4:53 PM   Result Value Ref Range    Lactic Acid (POC) 1.25 0.40 - 2.00 mmol/L   SAMPLES BEING HELD    Collection Time: 08/13/19  5:17 PM   Result Value Ref Range    SAMPLES BEING HELD 1 RED 1 SST 1 BLUE     COMMENT        Add-on orders for these samples will be processed based on acceptable specimen integrity and analyte stability, which may vary by analyte. Assessment/Plan:   A: 1. Right hand dorsal hand abscess    P: 1. Hospitalist to admit patient- appreciate their admission and medical management. 2. UA/UC/drug tox  3. Plan for OR tonight at 2030 for right hand I and D with culture.     4. NPO.    5. Broad spectrum antibiotics. 6. Orthopedics to follow. Discussed case with Dr. Cyndy Romo who agrees with plan.       Adrian Casarez, [place]   [facility]  [address] [place] [address] fingers/toes warm to touch/no paresthesia/no swelling/no cyanosis of extremity/capillary refill time < 2 seconds

## (undated) DEVICE — INFECTION CONTROL KIT SYS

## (undated) DEVICE — STERILE POLYISOPRENE POWDER-FREE SURGICAL GLOVES WITH EMOLLIENT COATING: Brand: PROTEXIS

## (undated) DEVICE — BANDAGE,GAUZE,BULKEE II,2.25"X3YD,STRL: Brand: MEDLINE

## (undated) DEVICE — COVER LT HNDL PLAS RIG 1 PER PK

## (undated) DEVICE — SWAB CULT DBL W/O CHAR RAYON TIP AMIES GEL CLMN FOR COLL

## (undated) DEVICE — REM POLYHESIVE ADULT PATIENT RETURN ELECTRODE: Brand: VALLEYLAB

## (undated) DEVICE — SOLUTION IV 1000ML 0.9% SOD CHL

## (undated) DEVICE — TOWEL SURG W17XL27IN STD BLU COT NONFENESTRATED PREWASHED

## (undated) DEVICE — (D)PREP SKN CHLRAPRP APPL 26ML -- CONVERT TO ITEM 371833

## (undated) DEVICE — ROCKER SWITCH PENCIL BLADE ELECTRODE, HOLSTER: Brand: EDGE

## (undated) DEVICE — GAUZE,PACKING STRIP,PLAIN,1/4"X5YD,STRL: Brand: CURAD

## (undated) DEVICE — DRAPE,REIN 53X77,STERILE: Brand: MEDLINE

## (undated) DEVICE — SPONGE GZ W4XL4IN COT 12 PLY TYP VII WVN C FLD DSGN

## (undated) DEVICE — CANISTER, RIGID, 3000CC: Brand: MEDLINE INDUSTRIES, INC.

## (undated) DEVICE — STERILE POLYISOPRENE POWDER-FREE SURGICAL GLOVES: Brand: PROTEXIS

## (undated) DEVICE — STRAP,POSITIONING,KNEE/BODY,FOAM,4X60": Brand: MEDLINE